# Patient Record
Sex: MALE | Race: WHITE | Employment: FULL TIME | URBAN - METROPOLITAN AREA
[De-identification: names, ages, dates, MRNs, and addresses within clinical notes are randomized per-mention and may not be internally consistent; named-entity substitution may affect disease eponyms.]

---

## 2017-08-18 ENCOUNTER — OFFICE VISIT (OUTPATIENT)
Dept: FAMILY MEDICINE CLINIC | Facility: CLINIC | Age: 52
End: 2017-08-18

## 2017-08-18 VITALS
TEMPERATURE: 98 F | HEIGHT: 71.5 IN | SYSTOLIC BLOOD PRESSURE: 120 MMHG | HEART RATE: 44 BPM | RESPIRATION RATE: 16 BRPM | BODY MASS INDEX: 24.79 KG/M2 | DIASTOLIC BLOOD PRESSURE: 80 MMHG | WEIGHT: 181 LBS

## 2017-08-18 DIAGNOSIS — M62.830 LUMBAR PARASPINAL MUSCLE SPASM: Primary | ICD-10-CM

## 2017-08-18 PROBLEM — D12.6 BENIGN NEOPLASM OF COLON: Status: ACTIVE | Noted: 2017-08-18

## 2017-08-18 PROCEDURE — 99214 OFFICE O/P EST MOD 30 MIN: CPT | Performed by: FAMILY MEDICINE

## 2017-08-18 RX ORDER — NAPROXEN 500 MG/1
500 TABLET ORAL 2 TIMES DAILY WITH MEALS
Qty: 28 TABLET | Refills: 0 | Status: SHIPPED | OUTPATIENT
Start: 2017-08-18 | End: 2017-09-01

## 2017-08-18 RX ORDER — CYCLOBENZAPRINE HCL 10 MG
10 TABLET ORAL 3 TIMES DAILY
Qty: 15 TABLET | Refills: 0 | Status: SHIPPED | OUTPATIENT
Start: 2017-08-18 | End: 2017-08-23

## 2017-08-18 RX ORDER — IBUPROFEN 200 MG
400 TABLET ORAL AS NEEDED
COMMUNITY
End: 2017-12-20

## 2017-08-18 NOTE — PATIENT INSTRUCTIONS
Back Spasm (No Trauma)    Spasm of the back muscles can occur after a sudden forceful twisting or bending force (such as in a car accident), after a simple awkward movement, or after lifting something heavy with poor body positioning.  In any case, muscle · You can start with ice, then switch to heat. Heat (hot shower, hot bath, or heating pad) reduces pain, and works well for muscle spasms. Apply heat to the painful area for 20 minutes, then remove it for 20 minutes.  Do this over a period of 60 to 90 minut If X-rays were taken, they may be reviewed by a radiologist. Renny Abreu will be notified of any new findings that may affect your care.   Call 911  Seek emergency medical care if any of these occur:  · Trouble breathing  · Confusion  · Drowsiness or trouble awaken · You may use over-the-counter pain medicine to control pain, unless another medicine was prescribed. If you have chronic liver or kidney disease or ever had a stomach ulcer or GI bleeding, talk with your healthcare provider before using these medicines.

## 2017-08-18 NOTE — PROGRESS NOTES
[facility] Family Medicine Office Note  Chief Complaint:   Patient presents with:  Back Pain: 3 weeks ago-during run LBP spasms-stopped, walk rest of way-chiro that Mon-better, ran again 1 week ago tomorrow-16 miles run-again Sun-ok.  ran [employer] mmol/L   CALCIUM 9.7 8.6 - 10.3 mg/dL   PROTEIN, TOTAL 6.6 6.1 - 8.1 g/dL   ALBUMIN 4.6 3.6 - 5.1 g/dL   GLOBULIN 2.0 1.9 - 3.7 g/dL (calc)   ALBUMIN/GLOBULIN RATIO 2.3 1.0 - 2.5 (calc)   BILIRUBIN, TOTAL 2.8 (H) 0.2 - 1.2 mg/dL   ALKALINE PHOSPHATASE 57 4 Smokeless tobacco: Never Used                      Alcohol use:  Yes              Comment: 3x week     Family History:  Family History   Problem Relation Age of Onset   • Pacemaker Father    • Glaucoma eczema or rhinitis.      EXAM:   /80 (BP Location: Right arm, Patient Position: Sitting, Cuff Size: adult)   Pulse (!) 44   Temp 98 °F (36.7 °C)   Resp 16   Ht 71.5\"   Wt 181 lb   BMI 24.89 kg/m²  Estimated body mass index is 24.89 kg/m² as calculate total) by mouth 2 (two) times daily with meals. Dispense: 28 tablet; Refill: 0  - XR LUMBAR SPINE (MIN 4 VIEWS) (CPT=72110);  Future      Meds & Refills for this Visit:    Signed Prescriptions Disp Refills    Cyclobenzaprine HCl 10 MG Oral Tab 15 tablet 0

## 2017-12-20 ENCOUNTER — OFFICE VISIT (OUTPATIENT)
Dept: FAMILY MEDICINE CLINIC | Facility: CLINIC | Age: 52
End: 2017-12-20

## 2017-12-20 VITALS
RESPIRATION RATE: 12 BRPM | HEIGHT: 71.5 IN | HEART RATE: 54 BPM | WEIGHT: 181 LBS | SYSTOLIC BLOOD PRESSURE: 100 MMHG | DIASTOLIC BLOOD PRESSURE: 70 MMHG | BODY MASS INDEX: 24.79 KG/M2

## 2017-12-20 DIAGNOSIS — Z13.228 SCREENING FOR ENDOCRINE, METABOLIC AND IMMUNITY DISORDER: ICD-10-CM

## 2017-12-20 DIAGNOSIS — Z13.0 SCREENING FOR ENDOCRINE, METABOLIC AND IMMUNITY DISORDER: ICD-10-CM

## 2017-12-20 DIAGNOSIS — Z13.89 SCREENING FOR GENITOURINARY CONDITION: ICD-10-CM

## 2017-12-20 DIAGNOSIS — Z00.00 ROUTINE GENERAL MEDICAL EXAMINATION AT A HEALTH CARE FACILITY: Primary | ICD-10-CM

## 2017-12-20 DIAGNOSIS — Z13.29 SCREENING FOR ENDOCRINE, METABOLIC AND IMMUNITY DISORDER: ICD-10-CM

## 2017-12-20 DIAGNOSIS — E01.0 THYROMEGALY: ICD-10-CM

## 2017-12-20 DIAGNOSIS — Z00.00 LABORATORY EXAMINATION ORDERED AS PART OF A ROUTINE GENERAL MEDICAL EXAMINATION: ICD-10-CM

## 2017-12-20 DIAGNOSIS — Z11.59 ENCOUNTER FOR HEPATITIS C SCREENING TEST FOR LOW RISK PATIENT: ICD-10-CM

## 2017-12-20 PROCEDURE — 99396 PREV VISIT EST AGE 40-64: CPT | Performed by: FAMILY MEDICINE

## 2017-12-20 PROCEDURE — 81003 URINALYSIS AUTO W/O SCOPE: CPT | Performed by: FAMILY MEDICINE

## 2017-12-20 NOTE — H&P
Padmini Drake is a 46year old male who presents for a complete physical exam.   HPI:   Pt complains of nothing. Wonders if he should worry about testosterone -- has no issues otherwise.   Pt. Notes discomfort in right medial elbow at times when he lif 50.0 %   MCV 95.1 80.0 - 100.0 fL   MCH 32.0 27.0 - 33.0 pg   MCHC 33.7 32.0 - 36.0 g/dL   RDW 12.5 11.0 - 15.0 %   PLATELET COUNT 471 925 - 400 Thousand/uL   MPV 9.3 7.5 - 12.5 fL   ABSOLUTE NEUTROPHILS 3,037 1,500 - 7,800 cells/uL   ABSOLUTE LYMPHOCYTES SYSTEMS:   GENERAL: feels well otherwise  SKIN: denies any unusual skin lesions  EYES:denies blurred vision or double vision  HEENT: denies nasal congestion, sinus pain or ST  LUNGS: denies shortness of breath with exertion  CARDIOVASCULAR: denies chest pa and PSA.      Routine general medical examination at a health care facility  (primary encounter diagnosis)  Screening for genitourinary condition  Laboratory examination ordered as part of a routine general medical examination  Encounter for hepatitis c scr

## 2017-12-28 ENCOUNTER — HOSPITAL ENCOUNTER (OUTPATIENT)
Dept: ULTRASOUND IMAGING | Facility: HOSPITAL | Age: 52
Discharge: HOME | End: 2017-12-28
Attending: FAMILY MEDICINE
Payer: COMMERCIAL

## 2017-12-28 DIAGNOSIS — E01.0 THYROMEGALY: ICD-10-CM

## 2017-12-28 PROCEDURE — 76536 US EXAM OF HEAD AND NECK: CPT | Performed by: FAMILY MEDICINE

## 2018-03-03 LAB
% FREE PSA: 43 % (CALC)
ABSOLUTE BASOPHILS: 30 CELLS/UL (ref 0–200)
ABSOLUTE EOSINOPHILS: 60 CELLS/UL (ref 15–500)
ABSOLUTE LYMPHOCYTES: 1522 CELLS/UL (ref 850–3900)
ABSOLUTE MONOCYTES: 400 CELLS/UL (ref 200–950)
ABSOLUTE NEUTROPHILS: 2288 CELLS/UL (ref 1500–7800)
ALBUMIN/GLOBULIN RATIO: 2.3 (CALC) (ref 1–2.5)
ALBUMIN: 4.4 G/DL (ref 3.6–5.1)
ALKALINE PHOSPHATASE: 55 U/L (ref 40–115)
ALT: 16 U/L (ref 9–46)
AST: 24 U/L (ref 10–35)
BASOPHILS: 0.7 %
BILIRUBIN, TOTAL: 3.2 MG/DL (ref 0.2–1.2)
BUN: 14 MG/DL (ref 7–25)
CALCIUM: 9.3 MG/DL (ref 8.6–10.3)
CARBON DIOXIDE: 30 MMOL/L (ref 20–31)
CHLORIDE: 106 MMOL/L (ref 98–110)
CHOL/HDLC RATIO: 2.6 (CALC)
CHOLESTEROL, TOTAL: 144 MG/DL
CREATININE: 1.11 MG/DL (ref 0.7–1.33)
EGFR IF AFRICN AM: 88 ML/MIN/1.73M2
EGFR IF NONAFRICN AM: 76 ML/MIN/1.73M2
EOSINOPHILS: 1.4 %
FREE PSA: 0.3 NG/ML
GLOBULIN: 1.9 G/DL (CALC) (ref 1.9–3.7)
GLUCOSE: 90 MG/DL (ref 65–99)
HDL CHOLESTEROL: 56 MG/DL
HEMATOCRIT: 41.5 % (ref 38.5–50)
HEMOGLOBIN: 14.4 G/DL (ref 13.2–17.1)
LDL-CHOLESTEROL: 74 MG/DL (CALC)
LYMPHOCYTES: 35.4 %
MCH: 32.9 PG (ref 27–33)
MCHC: 34.7 G/DL (ref 32–36)
MCV: 94.7 FL (ref 80–100)
MEASLES ANTIBODY (IGG): >300 AU/ML
MONOCYTES: 9.3 %
MPV: 10.8 FL (ref 7.5–12.5)
MUMPS VIRUS$ANTIBODY (IGG): >300 AU/ML
NEUTROPHILS: 53.2 %
NON-HDL CHOLESTEROL: 88 MG/DL (CALC)
PLATELET COUNT: 182 THOUSAND/UL (ref 140–400)
POTASSIUM: 4.1 MMOL/L (ref 3.5–5.3)
PROTEIN, TOTAL: 6.3 G/DL (ref 6.1–8.1)
RDW: 11.6 % (ref 11–15)
RED BLOOD CELL COUNT: 4.38 MILLION/UL (ref 4.2–5.8)
RUBELLA ANTIBODY (IGG) DIAGNOSTIC: 23.5 INDEX
RUBELLA ANTIBODY (IGM): <20 AU/ML
SIGNAL TO CUT-OFF: 0.01
SODIUM: 143 MMOL/L (ref 135–146)
TOTAL PSA: 0.7 NG/ML
TRIGLYCERIDES: 65 MG/DL
TSH W/REFLEX TO FT4: 2.12 MIU/L (ref 0.4–4.5)
VARICELLA ZOSTER VIRUS$AB (IGG): 508.7 INDEX
VITAMIN D, 25-OH, TOTAL: 32 NG/ML (ref 30–100)
WHITE BLOOD CELL COUNT: 4.3 THOUSAND/UL (ref 3.8–10.8)

## 2018-12-20 ENCOUNTER — OFFICE VISIT (OUTPATIENT)
Dept: FAMILY MEDICINE CLINIC | Facility: CLINIC | Age: 53
End: 2018-12-20
Payer: COMMERCIAL

## 2018-12-20 VITALS
SYSTOLIC BLOOD PRESSURE: 110 MMHG | WEIGHT: 184 LBS | RESPIRATION RATE: 14 BRPM | DIASTOLIC BLOOD PRESSURE: 70 MMHG | HEART RATE: 60 BPM | HEIGHT: 71 IN | BODY MASS INDEX: 25.76 KG/M2

## 2018-12-20 DIAGNOSIS — Z13.89 SCREENING FOR GENITOURINARY CONDITION: ICD-10-CM

## 2018-12-20 DIAGNOSIS — Z00.00 LABORATORY EXAMINATION ORDERED AS PART OF A ROUTINE GENERAL MEDICAL EXAMINATION: ICD-10-CM

## 2018-12-20 DIAGNOSIS — R10.32 LEFT GROIN PAIN: ICD-10-CM

## 2018-12-20 DIAGNOSIS — Z00.00 ROUTINE GENERAL MEDICAL EXAMINATION AT A HEALTH CARE FACILITY: Primary | ICD-10-CM

## 2018-12-20 PROCEDURE — 99396 PREV VISIT EST AGE 40-64: CPT | Performed by: FAMILY MEDICINE

## 2018-12-20 NOTE — H&P
Janet Vale is a 48year old male who presents for a complete physical exam.   HPI:   Pt complains of nothing. Wonders if he should worry about testosterone -- has no issues otherwise.   Pt. Notes discomfort in right medial elbow at times when he (BUN) 14 7 - 25 mg/dL    CREATININE 1.11 0.70 - 1.33 mg/dL    eGFR NON-AFR.  AMERICAN 76 > OR = 60 mL/min/1.73m2    eGFR AFRICAN AMERICAN 88 > OR = 60 mL/min/1.73m2    BUN/CREATININE RATIO NOT APPLICABLE 6 - 22 (calc)    SODIUM 143 135 - 146 mmol/L    POTAS 1000 units Oral Tab Take 1,000 Units by mouth daily. Disp:  Rfl:    Multiple Vitamins-Minerals (EYE-LORENA EXTRA PLUS LUTEIN) Oral Tab Take by mouth.  Disp:  Rfl:       No Known Allergies   Past Medical History:   Diagnosis Date   • Colon adenoma    • Goiter EOMI,conjunctiva are clear  NECK: supple,no adenopathy,no bruits; thyromegaly  CHEST: no chest tenderness  BREAST: no dominant or suspicious mass  LUNGS: clear to auscultation  CARDIO: RRR without murmur  GI: good BS's,no masses, HSM or tenderness  : Liya

## 2019-01-07 ENCOUNTER — OFFICE VISIT (OUTPATIENT)
Dept: PHYSICAL THERAPY | Age: 54
End: 2019-01-07
Attending: FAMILY MEDICINE
Payer: COMMERCIAL

## 2019-01-07 PROCEDURE — 97161 PT EVAL LOW COMPLEX 20 MIN: CPT

## 2019-01-08 NOTE — PROGRESS NOTES
LOWER EXTREMITY EVALUATION:   Referring Physician: Dr. Chantelle Owen MD  Diagnosis: Groin pain     Date of Service: 1/7/2019     PATIENT SUMMARY   Maggi Torres is a 48year old y/o male who presents to therapy today with complaints of pain in the tightness  Quads: moderate tightness bilaterally  Gastroc-soleus: minimal tightness    Strength/MMT:   Hip grossly 5/5  Knee grossly 5/5  Trunk flexors 5/5 - pain in lower left abdominal region    Special tests: Negative hip scour test    Balance: SLS: nor

## 2019-01-09 ENCOUNTER — OFFICE VISIT (OUTPATIENT)
Dept: PHYSICAL THERAPY | Age: 54
End: 2019-01-09
Attending: FAMILY MEDICINE
Payer: COMMERCIAL

## 2019-01-09 PROCEDURE — 97112 NEUROMUSCULAR REEDUCATION: CPT

## 2019-01-09 PROCEDURE — 97110 THERAPEUTIC EXERCISES: CPT

## 2019-01-09 PROCEDURE — 97140 MANUAL THERAPY 1/> REGIONS: CPT

## 2019-01-09 NOTE — PROGRESS NOTES
Dx: Left groin pain         Authorized # of Visits:  8         Next MD visit: none scheduled  Fall Risk: standard         Precautions: n/a             Subjective: Following evaluation pt reports no increase in pain.  Left lower abdomen/groin with getting up hip flexor stretch R/L 3 x 10 sec hold         Prone press up abdomen stretch 3 x 10 sec hold         Seated trunk lateral flexion stretch with arm overhead R/L 3 x 10 sec hold            Charges: MT 1, EX 1, NMRed 1       Total Timed Treatment: 40 min  To

## 2019-01-14 ENCOUNTER — OFFICE VISIT (OUTPATIENT)
Dept: PHYSICAL THERAPY | Age: 54
End: 2019-01-14
Attending: FAMILY MEDICINE
Payer: COMMERCIAL

## 2019-01-14 PROCEDURE — 97112 NEUROMUSCULAR REEDUCATION: CPT

## 2019-01-14 PROCEDURE — 97110 THERAPEUTIC EXERCISES: CPT

## 2019-01-14 PROCEDURE — 97140 MANUAL THERAPY 1/> REGIONS: CPT

## 2019-01-16 ENCOUNTER — OFFICE VISIT (OUTPATIENT)
Dept: PHYSICAL THERAPY | Age: 54
End: 2019-01-16
Attending: FAMILY MEDICINE
Payer: COMMERCIAL

## 2019-01-16 PROCEDURE — 97140 MANUAL THERAPY 1/> REGIONS: CPT

## 2019-01-16 PROCEDURE — 97110 THERAPEUTIC EXERCISES: CPT

## 2019-01-16 PROCEDURE — 97112 NEUROMUSCULAR REEDUCATION: CPT

## 2019-01-16 NOTE — PROGRESS NOTES
Dx: Left groin pain         Authorized # of Visits:  8         Next MD visit: none scheduled  Fall Risk: standard         Precautions: n/a             Subjective: Have not been running or working out except for the therapy exercises.   Objective: No pain ju sec hold Hook lying abd bracing bent leg lift R/L x 10  Bent leg fallout R/L x 10 Prone passive L hip ER/IR x 10       Hook lying abd bracing bent leg lift R/L x 10  Bent leg fallout R/L x 10 Prone press up abdominal stretch x 10 Prone press up 3 x 10 sec

## 2019-01-16 NOTE — PROGRESS NOTES
Dx: Left groin pain         Authorized # of Visits:  8         Next MD visit: none scheduled  Fall Risk: standard         Precautions: n/a             Subjective: Slight improvement since initial visit.  Have not been running or working out except for the t R/L x 10  Bent leg fallout R/L x 10 Prone press up abdominal stretch x 10        Hip flexor stretch over edge with opposite side hip flexion R/L 3 x 10 sec hold Prone quads and hip flexor stretch R/L 3 x 30 sec hold        STM 10 mins L lower abdomen and g

## 2019-01-23 ENCOUNTER — OFFICE VISIT (OUTPATIENT)
Dept: PHYSICAL THERAPY | Age: 54
End: 2019-01-23
Attending: FAMILY MEDICINE
Payer: COMMERCIAL

## 2019-01-23 PROCEDURE — 97140 MANUAL THERAPY 1/> REGIONS: CPT

## 2019-01-23 PROCEDURE — 97112 NEUROMUSCULAR REEDUCATION: CPT

## 2019-01-23 PROCEDURE — 97110 THERAPEUTIC EXERCISES: CPT

## 2019-01-24 NOTE — PROGRESS NOTES
Dx: Left groin pain         Authorized # of Visits:  8         Next MD visit: none scheduled  Fall Risk: standard         Precautions: n/a             Subjective: Biked on  indoor 2 days 30 mins each without pain.  I feel I am having less pain than a 30 secs       Hook lying trunk rotation with arms above head R/L 3 x 10 sec hold Supine L hip mobilizations to increase ER grade 3 2 x 30 secs Prone quads stretch L 3 x 30 sec hold  Hip flexor stretch with hip ext L 3 x 20 sec hold Hip flexor, quads stretc tendon insertion 10 mins total time Supine with hips flexed to 90 deg alternating leg ext R/L x 10  Pt education 5 mins Supine STM ant hip, medial femur at adductor tendon insertion and pubic bone at abdominal insertion 5 mins        Charges: MT 1, EX 1, N

## 2019-02-06 ENCOUNTER — OFFICE VISIT (OUTPATIENT)
Dept: PHYSICAL THERAPY | Age: 54
End: 2019-02-06
Attending: FAMILY MEDICINE
Payer: COMMERCIAL

## 2019-02-06 PROCEDURE — 97110 THERAPEUTIC EXERCISES: CPT

## 2019-02-06 PROCEDURE — 97140 MANUAL THERAPY 1/> REGIONS: CPT

## 2019-02-06 PROCEDURE — 97112 NEUROMUSCULAR REEDUCATION: CPT

## 2019-02-07 ENCOUNTER — APPOINTMENT (OUTPATIENT)
Dept: PHYSICAL THERAPY | Age: 54
End: 2019-02-07
Attending: FAMILY MEDICINE
Payer: COMMERCIAL

## 2019-02-07 NOTE — PROGRESS NOTES
Dx: Left groin pain         Authorized # of Visits:  8         Next MD visit: none scheduled  Fall Risk: standard         Precautions: n/a             Subjective: Biked on  indoor 2 days 30 mins each without pain.  I feel I am having less pain than a with arms above head R/L 3 x 10 sec hold Supine L hip mobilizations to increase ER grade 3 2 x 30 secs Prone quads stretch L 3 x 30 sec hold  Hip flexor stretch with hip ext L 3 x 20 sec hold Hip flexor, quads stretch over edge of table L/R 3 x 30 sec hold trunk rotation with reach x 10 Hook lying bent leg lift R/L x 10  Bent leg lift/lower x 10 Pt education 5 mins Side plank R/L 30 sec hold     Seated trunk lateral flexion stretch with arm overhead R/L 3 x 10 sec hold  Hook lying STM lower abdominal and add

## 2019-02-20 ENCOUNTER — APPOINTMENT (OUTPATIENT)
Dept: PHYSICAL THERAPY | Age: 54
End: 2019-02-20
Attending: FAMILY MEDICINE
Payer: COMMERCIAL

## 2019-02-27 ENCOUNTER — APPOINTMENT (OUTPATIENT)
Dept: PHYSICAL THERAPY | Age: 54
End: 2019-02-27
Attending: FAMILY MEDICINE
Payer: COMMERCIAL

## 2019-03-04 ENCOUNTER — OFFICE VISIT (OUTPATIENT)
Dept: PHYSICAL THERAPY | Age: 54
End: 2019-03-04
Attending: FAMILY MEDICINE
Payer: COMMERCIAL

## 2019-03-11 ENCOUNTER — HOSPITAL ENCOUNTER (OUTPATIENT)
Facility: HOSPITAL | Age: 54
Setting detail: HOSPITAL OUTPATIENT SURGERY
Discharge: HOME | End: 2019-03-11
Attending: INTERNAL MEDICINE | Admitting: INTERNAL MEDICINE
Payer: COMMERCIAL

## 2019-03-11 VITALS
HEART RATE: 41 BPM | TEMPERATURE: 98 F | HEIGHT: 71 IN | OXYGEN SATURATION: 98 % | RESPIRATION RATE: 10 BRPM | DIASTOLIC BLOOD PRESSURE: 78 MMHG | BODY MASS INDEX: 25.2 KG/M2 | SYSTOLIC BLOOD PRESSURE: 136 MMHG | WEIGHT: 180 LBS

## 2019-03-11 DIAGNOSIS — Z86.010 PERSONAL HISTORY OF COLONIC POLYPS: ICD-10-CM

## 2019-03-11 PROCEDURE — 88305 TISSUE EXAM BY PATHOLOGIST: CPT | Performed by: INTERNAL MEDICINE

## 2019-03-11 PROCEDURE — 0DBL8ZX EXCISION OF TRANSVERSE COLON, VIA NATURAL OR ARTIFICIAL OPENING ENDOSCOPIC, DIAGNOSTIC: ICD-10-PCS | Performed by: INTERNAL MEDICINE

## 2019-03-11 PROCEDURE — 99152 MOD SED SAME PHYS/QHP 5/>YRS: CPT | Performed by: INTERNAL MEDICINE

## 2019-03-11 RX ORDER — SODIUM CHLORIDE, SODIUM LACTATE, POTASSIUM CHLORIDE, CALCIUM CHLORIDE 600; 310; 30; 20 MG/100ML; MG/100ML; MG/100ML; MG/100ML
INJECTION, SOLUTION INTRAVENOUS CONTINUOUS
Status: DISCONTINUED | OUTPATIENT
Start: 2019-03-11 | End: 2019-03-11

## 2019-03-11 NOTE — OPERATIVE REPORT
[facility]  Colonoscopy Report      Dalila Jackson Patient Status:  Hospital Outpatient Surgery    10/18/1965 MRN [id number]   [facility] ENDOSCOPY Attending Gaye Meneses MD       DATE OF OPERATION: 3/11/2019     PREOPERATIVE

## 2019-03-11 NOTE — H&P
[facility]  Pre-procedure History and Physical      Rico Daniels Patient Status:  Hospital Outpatient Surgery    10/18/1965 MRN [id number]   [facility] ENDOSCOPY Attending Rob Degroot MD   Hosp Day # 0 PCP Dann Jin DO

## 2019-03-13 NOTE — PROGRESS NOTES
Please send to the patient:    Dear Jose E Merino,    I reviewed the results from your colonoscopy. You had one polyp removed. It is benign. In light of this and your history of colon polyps, your next colonoscopy should be in five years.     Please let me kn

## 2019-11-13 ENCOUNTER — PATIENT MESSAGE (OUTPATIENT)
Dept: FAMILY MEDICINE CLINIC | Facility: CLINIC | Age: 54
End: 2019-11-13

## 2019-11-13 DIAGNOSIS — Z00.00 LABORATORY EXAMINATION ORDERED AS PART OF A ROUTINE GENERAL MEDICAL EXAMINATION: Primary | ICD-10-CM

## 2019-11-13 DIAGNOSIS — Z13.89 SCREENING FOR GENITOURINARY CONDITION: ICD-10-CM

## 2019-11-15 NOTE — TELEPHONE ENCOUNTER
From: Cindy Moses  To: Rogerio Araujo DO  Sent: 11/13/2019 8:36 AM CST  Subject: Visit Follow-up Question    My annual physical is scheduled in two weeks (Nov 26th). A standard blood screen is usually prescribed as part of the visit.  Should I have th

## 2019-11-26 ENCOUNTER — OFFICE VISIT (OUTPATIENT)
Dept: FAMILY MEDICINE CLINIC | Facility: CLINIC | Age: 54
End: 2019-11-26
Payer: COMMERCIAL

## 2019-11-26 VITALS
RESPIRATION RATE: 16 BRPM | WEIGHT: 177 LBS | BODY MASS INDEX: 24.51 KG/M2 | DIASTOLIC BLOOD PRESSURE: 70 MMHG | HEART RATE: 56 BPM | TEMPERATURE: 97 F | HEIGHT: 71.25 IN | SYSTOLIC BLOOD PRESSURE: 110 MMHG

## 2019-11-26 DIAGNOSIS — M79.674 PAIN OF TOE OF RIGHT FOOT: ICD-10-CM

## 2019-11-26 DIAGNOSIS — Z00.00 ROUTINE GENERAL MEDICAL EXAMINATION AT A HEALTH CARE FACILITY: Primary | ICD-10-CM

## 2019-11-26 DIAGNOSIS — Z00.00 LABORATORY EXAMINATION ORDERED AS PART OF A ROUTINE GENERAL MEDICAL EXAMINATION: ICD-10-CM

## 2019-11-26 DIAGNOSIS — L57.0 AK (ACTINIC KERATOSIS): ICD-10-CM

## 2019-11-26 PROCEDURE — 99396 PREV VISIT EST AGE 40-64: CPT | Performed by: FAMILY MEDICINE

## 2019-11-26 RX ORDER — METHYLPREDNISOLONE 4 MG/1
TABLET ORAL
Qty: 1 KIT | Refills: 0 | Status: SHIPPED | OUTPATIENT
Start: 2019-11-26 | End: 2020-02-11 | Stop reason: ALTCHOICE

## 2019-11-26 NOTE — H&P
Scott Liu is a 47year old male who presents for a complete physical exam.   HPI:   Pt complains of left great toe pain since he stubbed it against a rock about 1 month ago.   He states it is feeling better but notes tenderness in the distal aspect Take 1,000 Units by mouth daily. • Multiple Vitamins-Minerals (EYE-LORENA EXTRA PLUS LUTEIN) Oral Tab Take by mouth.         No Known Allergies   Past Medical History:   Diagnosis Date   • Colon adenoma    • Goiter    • Viral warts       Past Surgical His distress  SKIN: no rashes,no suspicious lesions; AK?  Right proximal thumb and left forearm  HEENT: atraumatic, normocephalic,ears -fluid behind left TM and throat are clear, mild swollen turbs with clear drainage  EYES:PERRLA, EOMI,conjunctiva are clear  N Prescriptions     Signed Prescriptions Disp Refills   • methylPREDNISolone (MEDROL) 4 MG Oral Tablet Therapy Pack 1 kit 0     Sig: As directed.        Imaging & Consults:  ZOSTER VACC RECOMBINANT IM NJX  XR TOE(S) (MIN 2 VIEWS), RIGHT 1ST (CPT=73660)    LAs

## 2020-02-11 ENCOUNTER — OFFICE VISIT (OUTPATIENT)
Dept: FAMILY MEDICINE CLINIC | Facility: CLINIC | Age: 55
End: 2020-02-11
Payer: COMMERCIAL

## 2020-02-11 VITALS
HEIGHT: 71.25 IN | DIASTOLIC BLOOD PRESSURE: 70 MMHG | HEART RATE: 60 BPM | TEMPERATURE: 97 F | SYSTOLIC BLOOD PRESSURE: 116 MMHG | BODY MASS INDEX: 24.09 KG/M2 | OXYGEN SATURATION: 98 % | WEIGHT: 174 LBS | RESPIRATION RATE: 16 BRPM

## 2020-02-11 DIAGNOSIS — Z23 NEED FOR VACCINATION: ICD-10-CM

## 2020-02-11 DIAGNOSIS — Z71.85 VACCINE COUNSELING: ICD-10-CM

## 2020-02-11 DIAGNOSIS — L57.0 AK (ACTINIC KERATOSIS): Primary | ICD-10-CM

## 2020-02-11 PROCEDURE — 17003 DESTRUCT PREMALG LES 2-14: CPT | Performed by: FAMILY MEDICINE

## 2020-02-11 PROCEDURE — 17000 DESTRUCT PREMALG LESION: CPT | Performed by: FAMILY MEDICINE

## 2020-02-11 RX ORDER — OMEGA-3 FATTY ACIDS CAP DELAYED RELEASE 1000 MG 1000 MG
2 CAPSULE DELAYED RELEASE ORAL
COMMUNITY
End: 2021-11-12

## 2020-02-11 NOTE — PROGRESS NOTES
Tashi Turner is a 47year old male. HPI:   Pt.  Is here for AK removal.  He states he would like to shop around for the shingrix vaccine at this time,.      methylPREDNISolone (MEDROL) 4 MG Oral Tablet Therapy Pack, As directed., Disp: 1 kit, Rfl: 0 1.33 mg/dL    eGFR NON-AFR.  AMERICAN 71 > OR = 60 mL/min/1.73m2    eGFR AFRICAN AMERICAN 82 > OR = 60 mL/min/1.73m2    BUN/CREATININE RATIO NOT APPLICABLE 6 - 22 (calc)    SODIUM 142 135 - 146 mmol/L    POTASSIUM 4.0 3.5 - 5.3 mmol/L    CHLORIDE 105 98 - 1 exertion  CARDIOVASCULAR: denies chest pain on exertion  GI: denies abdominal pain,denies heartburn  MUSCULOSKELETAL: denies back pain  EXTREMITIES:  No pain or numbness    EXAM:   /70 (BP Location: Right arm, Patient Position: Sitting, Cuff Size: ad

## 2020-02-20 ENCOUNTER — HOSPITAL ENCOUNTER (OUTPATIENT)
Age: 55
Discharge: HOME | End: 2020-02-20
Attending: EMERGENCY MEDICINE
Payer: COMMERCIAL

## 2020-02-20 VITALS
SYSTOLIC BLOOD PRESSURE: 114 MMHG | TEMPERATURE: 98 F | OXYGEN SATURATION: 100 % | RESPIRATION RATE: 18 BRPM | DIASTOLIC BLOOD PRESSURE: 74 MMHG | WEIGHT: 180 LBS | BODY MASS INDEX: 24.38 KG/M2 | HEIGHT: 72 IN | HEART RATE: 60 BPM

## 2020-02-20 DIAGNOSIS — J11.1 INFLUENZA: ICD-10-CM

## 2020-02-20 DIAGNOSIS — J02.9 ACUTE VIRAL PHARYNGITIS: Primary | ICD-10-CM

## 2020-02-20 LAB
POCT INFLUENZA A: NEGATIVE
POCT INFLUENZA B: POSITIVE
POCT RAPID STREP: NEGATIVE

## 2020-02-20 PROCEDURE — 99214 OFFICE O/P EST MOD 30 MIN: CPT

## 2020-02-20 PROCEDURE — 87502 INFLUENZA DNA AMP PROBE: CPT | Performed by: EMERGENCY MEDICINE

## 2020-02-20 PROCEDURE — 99204 OFFICE O/P NEW MOD 45 MIN: CPT

## 2020-02-20 PROCEDURE — 87081 CULTURE SCREEN ONLY: CPT | Performed by: EMERGENCY MEDICINE

## 2020-02-20 PROCEDURE — 87430 STREP A AG IA: CPT | Performed by: EMERGENCY MEDICINE

## 2020-02-20 RX ORDER — OSELTAMIVIR PHOSPHATE 75 MG/1
75 CAPSULE ORAL 2 TIMES DAILY
Qty: 10 CAPSULE | Refills: 0 | Status: SHIPPED | OUTPATIENT
Start: 2020-02-20 | End: 2020-02-25

## 2020-02-20 NOTE — ED PROVIDER NOTES
Patient Seen in: [address] [facility]      History   Patient presents with:  Sore Throat    Stated Complaint: sore throat    HPI    This is a 49-year-old male who arrives here with complaints of a sore throat since yesterday.   The pa 100%   BMI 24.41 kg/m²         Physical Exam    General:    HEENT: The patient has findings of a viral syndrome. TMs are clear without erythema  Throat has mild erythema without exudate, peritonsillar abscess  There is no meningismus.       Lungs: Clear to

## 2020-02-20 NOTE — ED INITIAL ASSESSMENT (HPI)
Pt c/o sore throat since yesterday, and states he has had flu like s/s including body aches, intermittent fevers as high as 100.4 at home, cough, that started Monday night.

## 2020-03-19 ENCOUNTER — PATIENT MESSAGE (OUTPATIENT)
Dept: FAMILY MEDICINE CLINIC | Facility: CLINIC | Age: 55
End: 2020-03-19

## 2020-03-20 NOTE — TELEPHONE ENCOUNTER
From: Maricarmen Vaughan  To: Michelle Guillen DO  Sent: 3/19/2020 4:02 PM CDT  Subject: Other    I am required to supply Medical Form C to the [employer] ([employer]). Please fill out the attached based on my last physical in Q4 of 2019. Thanks.  Wilmer Sauceda

## 2020-03-23 ENCOUNTER — MED REC SCAN ONLY (OUTPATIENT)
Dept: FAMILY MEDICINE CLINIC | Facility: CLINIC | Age: 55
End: 2020-03-23

## 2020-11-11 ENCOUNTER — PATIENT MESSAGE (OUTPATIENT)
Dept: FAMILY MEDICINE CLINIC | Facility: CLINIC | Age: 55
End: 2020-11-11

## 2020-11-11 DIAGNOSIS — E55.9 VITAMIN D DEFICIENCY: ICD-10-CM

## 2020-11-11 DIAGNOSIS — Z13.89 SCREENING FOR GENITOURINARY CONDITION: ICD-10-CM

## 2020-11-11 DIAGNOSIS — Z00.00 LABORATORY EXAMINATION ORDERED AS PART OF A ROUTINE GENERAL MEDICAL EXAMINATION: Primary | ICD-10-CM

## 2020-11-11 NOTE — TELEPHONE ENCOUNTER
From: Mari Luis  To: Jovany Blanco DO  Sent: 11/11/2020 9:35 AM CST  Subject: Visit Follow-up Question    My annual physical is next week - Nov 16.  Should I have blood work done prior so that results can be discussed when I am in office on the 16t

## 2020-11-15 ENCOUNTER — PATIENT MESSAGE (OUTPATIENT)
Dept: FAMILY MEDICINE CLINIC | Facility: CLINIC | Age: 55
End: 2020-11-15

## 2020-11-20 DIAGNOSIS — R73.9 HYPERGLYCEMIA: Primary | ICD-10-CM

## 2020-11-20 RX ORDER — THIAMINE HCL 100 MG
TABLET ORAL
COMMUNITY
Start: 2020-01-01 | End: 2021-11-12

## 2020-11-23 ENCOUNTER — TELEPHONE (OUTPATIENT)
Dept: FAMILY MEDICINE CLINIC | Facility: CLINIC | Age: 55
End: 2020-11-23

## 2020-11-23 RX ORDER — ERGOCALCIFEROL 1.25 MG/1
50000 CAPSULE ORAL WEEKLY
Qty: 8 CAPSULE | Refills: 0 | Status: SHIPPED | OUTPATIENT
Start: 2020-11-23 | End: 2021-11-12

## 2020-11-24 ENCOUNTER — OFFICE VISIT (OUTPATIENT)
Dept: FAMILY MEDICINE CLINIC | Facility: CLINIC | Age: 55
End: 2020-11-24
Payer: COMMERCIAL

## 2020-11-24 VITALS
HEIGHT: 72 IN | RESPIRATION RATE: 14 BRPM | WEIGHT: 178 LBS | DIASTOLIC BLOOD PRESSURE: 54 MMHG | HEART RATE: 48 BPM | SYSTOLIC BLOOD PRESSURE: 126 MMHG | TEMPERATURE: 98 F | BODY MASS INDEX: 24.11 KG/M2

## 2020-11-24 DIAGNOSIS — Z71.85 VACCINE COUNSELING: ICD-10-CM

## 2020-11-24 DIAGNOSIS — Z00.00 ROUTINE GENERAL MEDICAL EXAMINATION AT A HEALTH CARE FACILITY: Primary | ICD-10-CM

## 2020-11-24 DIAGNOSIS — D17.22 LIPOMA OF LEFT UPPER EXTREMITY: ICD-10-CM

## 2020-11-24 DIAGNOSIS — E55.9 VITAMIN D DEFICIENCY: ICD-10-CM

## 2020-11-24 DIAGNOSIS — R79.89 ABNORMAL CBC: ICD-10-CM

## 2020-11-24 PROCEDURE — 3078F DIAST BP <80 MM HG: CPT | Performed by: FAMILY MEDICINE

## 2020-11-24 PROCEDURE — 3074F SYST BP LT 130 MM HG: CPT | Performed by: FAMILY MEDICINE

## 2020-11-24 PROCEDURE — 3008F BODY MASS INDEX DOCD: CPT | Performed by: FAMILY MEDICINE

## 2020-11-24 PROCEDURE — 99396 PREV VISIT EST AGE 40-64: CPT | Performed by: FAMILY MEDICINE

## 2020-11-24 NOTE — H&P
Edilia Yuen is a 54year old male who presents for a complete physical exam.   HPI:   Pt complains of right knee pain and seeing chiro -- recent MRI right knee wnl.         Wt Readings from Last 6 Encounters:  11/24/20 : 178 lb (80.7 kg)  02/20/20 : PHOSPHATASE 62 35 - 144 U/L    AST 34 10 - 35 U/L    ALT 23 9 - 46 U/L   TSH W REFLEX TO FREE T4   Result Value Ref Range    TSH W/REFLEX TO FT4 2.11 0.40 - 4.50 mIU/L   LIPID PANEL   Result Value Ref Range    CHOLESTEROL, TOTAL 169 <200 mg/dL    HDL SALINA Take 1,000 Units by mouth daily. • Multiple Vitamins-Minerals (EYE-LORENA EXTRA PLUS LUTEIN) Oral Tab Take by mouth.         No Known Allergies   Past Medical History:   Diagnosis Date   • Colon adenoma    • Goiter    • Viral warts       Past Surgical His kg/m².   GENERAL: well developed, well nourished,in no apparent distress  SKIN: no rashes,no suspicious lesions  HEENT: atraumatic, normocephalic,ears -fluid behind left TM and throat are clear, mild swollen turbs with clear drainage  EYES:PERRLA, EOMI,con

## 2020-12-04 ENCOUNTER — MED REC SCAN ONLY (OUTPATIENT)
Dept: FAMILY MEDICINE CLINIC | Facility: CLINIC | Age: 55
End: 2020-12-04

## 2020-12-28 RX ORDER — METHYLPREDNISOLONE 4 MG/1
TABLET ORAL
Qty: 21 TABLET | Refills: 0 | OUTPATIENT
Start: 2020-12-28

## 2021-02-16 ENCOUNTER — PATIENT MESSAGE (OUTPATIENT)
Dept: FAMILY MEDICINE CLINIC | Facility: CLINIC | Age: 56
End: 2021-02-16

## 2021-02-16 NOTE — TELEPHONE ENCOUNTER
From: Yanely Perez  To: Ahmad Aase, DO  Sent: 2/16/2021 4:26 PM CST  Subject: Visit Follow-up Question    Based on my lab results and checkup in Nov,  ordered two follow up tests to be completed in three months.  They are a \"CBC and vitamin d in

## 2021-02-27 LAB — HEMOGLOBIN A1C: 5 % OF TOTAL HGB

## 2021-03-01 DIAGNOSIS — E55.9 VITAMIN D DEFICIENCY: ICD-10-CM

## 2021-03-01 DIAGNOSIS — D72.9 ABNORMAL WBC COUNT: Primary | ICD-10-CM

## 2021-11-12 ENCOUNTER — OFFICE VISIT (OUTPATIENT)
Dept: FAMILY MEDICINE CLINIC | Facility: CLINIC | Age: 56
End: 2021-11-12
Payer: COMMERCIAL

## 2021-11-12 VITALS
SYSTOLIC BLOOD PRESSURE: 118 MMHG | RESPIRATION RATE: 18 BRPM | TEMPERATURE: 97 F | BODY MASS INDEX: 24.11 KG/M2 | HEIGHT: 72 IN | WEIGHT: 178 LBS | HEART RATE: 50 BPM | DIASTOLIC BLOOD PRESSURE: 74 MMHG

## 2021-11-12 DIAGNOSIS — R35.0 BENIGN PROSTATIC HYPERPLASIA WITH URINARY FREQUENCY: ICD-10-CM

## 2021-11-12 DIAGNOSIS — Z13.89 SCREENING FOR GENITOURINARY CONDITION: ICD-10-CM

## 2021-11-12 DIAGNOSIS — N40.1 BENIGN PROSTATIC HYPERPLASIA WITH URINARY FREQUENCY: ICD-10-CM

## 2021-11-12 DIAGNOSIS — Z00.00 ROUTINE GENERAL MEDICAL EXAMINATION AT A HEALTH CARE FACILITY: Primary | ICD-10-CM

## 2021-11-12 DIAGNOSIS — Z00.00 LABORATORY EXAMINATION ORDERED AS PART OF A ROUTINE GENERAL MEDICAL EXAMINATION: ICD-10-CM

## 2021-11-12 DIAGNOSIS — I73.00 RAYNAUD'S PHENOMENON WITHOUT GANGRENE: ICD-10-CM

## 2021-11-12 DIAGNOSIS — Z71.85 VACCINE COUNSELING: ICD-10-CM

## 2021-11-12 PROCEDURE — 3078F DIAST BP <80 MM HG: CPT | Performed by: FAMILY MEDICINE

## 2021-11-12 PROCEDURE — 3008F BODY MASS INDEX DOCD: CPT | Performed by: FAMILY MEDICINE

## 2021-11-12 PROCEDURE — 3074F SYST BP LT 130 MM HG: CPT | Performed by: FAMILY MEDICINE

## 2021-11-12 PROCEDURE — 99396 PREV VISIT EST AGE 40-64: CPT | Performed by: FAMILY MEDICINE

## 2021-11-12 NOTE — H&P
Bart Villa is a 64year old male who presents for a complete physical exam.   HPI:   Pt complains of nothing.         Wt Readings from Last 6 Encounters:  11/12/21 : 178 lb (80.7 kg)  11/24/20 : 178 lb (80.7 kg)  02/20/20 : 180 lb (81.6 kg)  02/11/2 VASECTOMY        Family History   Problem Relation Age of Onset   • Pacemaker Father    • Glaucoma Father    • Heart Disorder Father    • Glaucoma Mother    • Other (macular degeneration) Mother       Social History:  Social History    Tobacco Use      Smo good rectal tone, prostate shows no masses  with  enlargement  MUSCULOSKELETAL: back is not tender,FROM of the back; lipoma on ant.  Left shoulder  EXTREMITIES: no cyanosis, clubbing or edema  NEURO: Oriented times three,cranial nerves are intact,motor and

## 2022-12-13 LAB
% FREE PSA: 44 % (CALC)
ABSOLUTE BASOPHILS: 30 CELLS/UL (ref 0–200)
ABSOLUTE EOSINOPHILS: 41 CELLS/UL (ref 15–500)
ABSOLUTE LYMPHOCYTES: 710 CELLS/UL (ref 850–3900)
ABSOLUTE MONOCYTES: 311 CELLS/UL (ref 200–950)
ABSOLUTE NEUTROPHILS: 2609 CELLS/UL (ref 1500–7800)
ALBUMIN/GLOBULIN RATIO: 2.6 (CALC) (ref 1–2.5)
ALBUMIN: 4.7 G/DL (ref 3.6–5.1)
ALKALINE PHOSPHATASE: 56 U/L (ref 35–144)
ALT: 25 U/L (ref 9–46)
APPEARANCE: CLEAR
AST: 44 U/L (ref 10–35)
BASOPHILS: 0.8 %
BILIRUBIN, TOTAL: 4 MG/DL (ref 0.2–1.2)
BILIRUBIN: NEGATIVE
BUN: 19 MG/DL (ref 7–25)
CALCIUM: 9.5 MG/DL (ref 8.6–10.3)
CARBON DIOXIDE: 29 MMOL/L (ref 20–32)
CHLORIDE: 102 MMOL/L (ref 98–110)
CHOL/HDLC RATIO: 2.1 (CALC)
CHOLESTEROL, TOTAL: 176 MG/DL
COLOR: YELLOW
CREATININE: 1.26 MG/DL (ref 0.7–1.3)
EGFR: 67 ML/MIN/1.73M2
EOSINOPHILS: 1.1 %
FREE PSA: 0.4 NG/ML
GLOBULIN: 1.8 G/DL (CALC) (ref 1.9–3.7)
GLUCOSE: 98 MG/DL (ref 65–99)
GLUCOSE: NEGATIVE
HDL CHOLESTEROL: 82 MG/DL
HEMATOCRIT: 46.9 % (ref 38.5–50)
HEMOGLOBIN: 15.5 G/DL (ref 13.2–17.1)
KETONES: NEGATIVE
LDL-CHOLESTEROL: 80 MG/DL (CALC)
LEUKOCYTE ESTERASE: NEGATIVE
LYMPHOCYTES: 19.2 %
MCH: 32 PG (ref 27–33)
MCHC: 33 G/DL (ref 32–36)
MCV: 96.7 FL (ref 80–100)
MONOCYTES: 8.4 %
MPV: 10.7 FL (ref 7.5–12.5)
NEUTROPHILS: 70.5 %
NITRITE: NEGATIVE
NON-HDL CHOLESTEROL: 94 MG/DL (CALC)
OCCULT BLOOD: NEGATIVE
PH: 6 (ref 5–8)
PLATELET COUNT: 213 THOUSAND/UL (ref 140–400)
POTASSIUM: 4.3 MMOL/L (ref 3.5–5.3)
PROTEIN, TOTAL: 6.5 G/DL (ref 6.1–8.1)
PROTEIN: NEGATIVE
RDW: 11.7 % (ref 11–15)
RED BLOOD CELL COUNT: 4.85 MILLION/UL (ref 4.2–5.8)
SODIUM: 139 MMOL/L (ref 135–146)
SPECIFIC GRAVITY: 1.01 (ref 1–1.03)
TOTAL PSA: 0.9 NG/ML
TRIGLYCERIDES: 61 MG/DL
TSH W/REFLEX TO FT4: 2.32 MIU/L (ref 0.4–4.5)
VITAMIN D, 25-OH, TOTAL: 55 NG/ML (ref 30–100)
WHITE BLOOD CELL COUNT: 3.7 THOUSAND/UL (ref 3.8–10.8)

## 2022-12-15 DIAGNOSIS — R79.9 ABNORMAL BLOOD CHEMISTRY: Primary | ICD-10-CM

## 2022-12-15 DIAGNOSIS — R79.89 ABNORMAL CBC: ICD-10-CM

## 2022-12-19 ENCOUNTER — OFFICE VISIT (OUTPATIENT)
Dept: FAMILY MEDICINE CLINIC | Facility: CLINIC | Age: 57
End: 2022-12-19
Payer: COMMERCIAL

## 2022-12-19 VITALS
WEIGHT: 179 LBS | SYSTOLIC BLOOD PRESSURE: 120 MMHG | OXYGEN SATURATION: 99 % | DIASTOLIC BLOOD PRESSURE: 64 MMHG | RESPIRATION RATE: 18 BRPM | HEART RATE: 49 BPM | HEIGHT: 72 IN | BODY MASS INDEX: 24.24 KG/M2

## 2022-12-19 DIAGNOSIS — D72.810 LYMPHOPENIA: ICD-10-CM

## 2022-12-19 DIAGNOSIS — Z00.00 ROUTINE GENERAL MEDICAL EXAMINATION AT A HEALTH CARE FACILITY: Primary | ICD-10-CM

## 2022-12-19 DIAGNOSIS — R79.89 ELEVATED LFTS: ICD-10-CM

## 2022-12-19 DIAGNOSIS — Z71.85 VACCINE COUNSELING: ICD-10-CM

## 2022-12-19 PROCEDURE — 3074F SYST BP LT 130 MM HG: CPT | Performed by: FAMILY MEDICINE

## 2022-12-19 PROCEDURE — 3008F BODY MASS INDEX DOCD: CPT | Performed by: FAMILY MEDICINE

## 2022-12-19 PROCEDURE — 3078F DIAST BP <80 MM HG: CPT | Performed by: FAMILY MEDICINE

## 2022-12-19 PROCEDURE — 99396 PREV VISIT EST AGE 40-64: CPT | Performed by: FAMILY MEDICINE

## 2023-03-16 ENCOUNTER — PATIENT MESSAGE (OUTPATIENT)
Dept: FAMILY MEDICINE CLINIC | Facility: CLINIC | Age: 58
End: 2023-03-16

## 2023-03-16 NOTE — TELEPHONE ENCOUNTER
From: Roxana Aguirre  To: Sivan Wheat DO  Sent: 3/16/2023 12:49 PM CDT  Subject: Follow up blood work     Dr. Tanja Fernandes ordered the following blood work as a follow up to my annual physical in Dec 2022:   \"Repeat CBC and CMp in 1 month. Avoid alcohol 1 week prior to the test.\"  I have scheduled an appointment with [address] [place] [address] on 3/17. I noticed their Order Requisition Details (attached) appear to be different that those of Dr. Tanja Gomes. Please advise or provide [facility] with updated orders.    Thanks

## 2023-03-17 LAB
ABSOLUTE BASOPHILS: 30 CELLS/UL (ref 0–200)
ABSOLUTE EOSINOPHILS: 19 CELLS/UL (ref 15–500)
ABSOLUTE LYMPHOCYTES: 1006 CELLS/UL (ref 850–3900)
ABSOLUTE MONOCYTES: 389 CELLS/UL (ref 200–950)
ABSOLUTE NEUTROPHILS: 2257 CELLS/UL (ref 1500–7800)
ALBUMIN/GLOBULIN RATIO: 2.4 (CALC) (ref 1–2.5)
ALBUMIN: 4.3 G/DL (ref 3.6–5.1)
ALKALINE PHOSPHATASE: 54 U/L (ref 35–144)
ALT: 20 U/L (ref 9–46)
AST: 35 U/L (ref 10–35)
BASOPHILS: 0.8 %
BILIRUBIN, TOTAL: 3.6 MG/DL (ref 0.2–1.2)
BUN/CREATININE RATIO: 12 (CALC) (ref 6–22)
BUN: 16 MG/DL (ref 7–25)
CALCIUM: 9.3 MG/DL (ref 8.6–10.3)
CARBON DIOXIDE: 31 MMOL/L (ref 20–32)
CHLORIDE: 104 MMOL/L (ref 98–110)
CREATININE: 1.34 MG/DL (ref 0.7–1.3)
EGFR: 62 ML/MIN/1.73M2
EOSINOPHILS: 0.5 %
GLOBULIN: 1.8 G/DL (CALC) (ref 1.9–3.7)
GLUCOSE: 95 MG/DL (ref 65–99)
HEMATOCRIT: 44.1 % (ref 38.5–50)
HEMOGLOBIN: 14.6 G/DL (ref 13.2–17.1)
LYMPHOCYTES: 27.2 %
MCH: 32.5 PG (ref 27–33)
MCHC: 33.1 G/DL (ref 32–36)
MCV: 98.2 FL (ref 80–100)
MONOCYTES: 10.5 %
MPV: 10.8 FL (ref 7.5–12.5)
NEUTROPHILS: 61 %
PLATELET COUNT: 194 THOUSAND/UL (ref 140–400)
POTASSIUM: 4.5 MMOL/L (ref 3.5–5.3)
PROTEIN, TOTAL: 6.1 G/DL (ref 6.1–8.1)
RDW: 11.6 % (ref 11–15)
RED BLOOD CELL COUNT: 4.49 MILLION/UL (ref 4.2–5.8)
SODIUM: 141 MMOL/L (ref 135–146)
WHITE BLOOD CELL COUNT: 3.7 THOUSAND/UL (ref 3.8–10.8)

## 2023-11-03 ENCOUNTER — OFFICE VISIT (OUTPATIENT)
Dept: FAMILY MEDICINE CLINIC | Facility: CLINIC | Age: 58
End: 2023-11-03
Payer: COMMERCIAL

## 2023-11-03 VITALS
SYSTOLIC BLOOD PRESSURE: 130 MMHG | DIASTOLIC BLOOD PRESSURE: 80 MMHG | WEIGHT: 178 LBS | OXYGEN SATURATION: 99 % | RESPIRATION RATE: 18 BRPM | HEART RATE: 47 BPM | HEIGHT: 72 IN | BODY MASS INDEX: 24.11 KG/M2

## 2023-11-03 DIAGNOSIS — N40.1 BENIGN PROSTATIC HYPERPLASIA WITH URINARY FREQUENCY: ICD-10-CM

## 2023-11-03 DIAGNOSIS — Z23 NEED FOR VACCINATION: ICD-10-CM

## 2023-11-03 DIAGNOSIS — Z00.00 ROUTINE GENERAL MEDICAL EXAMINATION AT A HEALTH CARE FACILITY: Primary | ICD-10-CM

## 2023-11-03 DIAGNOSIS — Z12.11 SCREENING FOR COLON CANCER: ICD-10-CM

## 2023-11-03 DIAGNOSIS — Z13.89 SCREENING FOR GENITOURINARY CONDITION: ICD-10-CM

## 2023-11-03 DIAGNOSIS — R35.0 BENIGN PROSTATIC HYPERPLASIA WITH URINARY FREQUENCY: ICD-10-CM

## 2023-11-03 DIAGNOSIS — Z00.00 LABORATORY EXAMINATION ORDERED AS PART OF A ROUTINE GENERAL MEDICAL EXAMINATION: ICD-10-CM

## 2023-11-03 PROCEDURE — 90471 IMMUNIZATION ADMIN: CPT | Performed by: FAMILY MEDICINE

## 2023-11-03 PROCEDURE — 3079F DIAST BP 80-89 MM HG: CPT | Performed by: FAMILY MEDICINE

## 2023-11-03 PROCEDURE — 3008F BODY MASS INDEX DOCD: CPT | Performed by: FAMILY MEDICINE

## 2023-11-03 PROCEDURE — 3075F SYST BP GE 130 - 139MM HG: CPT | Performed by: FAMILY MEDICINE

## 2023-11-03 PROCEDURE — 90686 IIV4 VACC NO PRSV 0.5 ML IM: CPT | Performed by: FAMILY MEDICINE

## 2023-11-03 PROCEDURE — 99396 PREV VISIT EST AGE 40-64: CPT | Performed by: FAMILY MEDICINE

## 2023-11-07 LAB
ABSOLUTE BASOPHILS: 32 CELLS/UL (ref 0–200)
ABSOLUTE EOSINOPHILS: 60 CELLS/UL (ref 15–500)
ABSOLUTE LYMPHOCYTES: 896 CELLS/UL (ref 850–3900)
ABSOLUTE MONOCYTES: 319 CELLS/UL (ref 200–950)
ABSOLUTE NEUTROPHILS: 2195 CELLS/UL (ref 1500–7800)
ALBUMIN/GLOBULIN RATIO: 2.1 (CALC) (ref 1–2.5)
ALBUMIN: 4.2 G/DL (ref 3.6–5.1)
ALKALINE PHOSPHATASE: 57 U/L (ref 35–144)
ALT: 25 U/L (ref 9–46)
APPEARANCE: CLEAR
AST: 41 U/L (ref 10–35)
BASOPHILS: 0.9 %
BILIRUBIN, TOTAL: 3 MG/DL (ref 0.2–1.2)
BILIRUBIN: NEGATIVE
BUN: 16 MG/DL (ref 7–25)
CALCIUM: 9.4 MG/DL (ref 8.6–10.3)
CARBON DIOXIDE: 30 MMOL/L (ref 20–32)
CHLORIDE: 104 MMOL/L (ref 98–110)
CHOL/HDLC RATIO: 2.2 (CALC)
CHOLESTEROL, TOTAL: 163 MG/DL
COLOR: YELLOW
CREATININE: 1.18 MG/DL (ref 0.7–1.3)
EGFR: 72 ML/MIN/1.73M2
EOSINOPHILS: 1.7 %
GLOBULIN: 2 G/DL (CALC) (ref 1.9–3.7)
GLUCOSE: 96 MG/DL (ref 65–99)
GLUCOSE: NEGATIVE
HDL CHOLESTEROL: 73 MG/DL
HEMATOCRIT: 42 % (ref 38.5–50)
HEMOGLOBIN: 14.2 G/DL (ref 13.2–17.1)
KETONES: NEGATIVE
LDL-CHOLESTEROL: 76 MG/DL (CALC)
LEUKOCYTE ESTERASE: NEGATIVE
LYMPHOCYTES: 25.6 %
MCH: 32.6 PG (ref 27–33)
MCHC: 33.8 G/DL (ref 32–36)
MCV: 96.6 FL (ref 80–100)
MONOCYTES: 9.1 %
MPV: 10.7 FL (ref 7.5–12.5)
NEUTROPHILS: 62.7 %
NITRITE: NEGATIVE
NON-HDL CHOLESTEROL: 90 MG/DL (CALC)
OCCULT BLOOD: NEGATIVE
PH: 6 (ref 5–8)
PLATELET COUNT: 175 THOUSAND/UL (ref 140–400)
POTASSIUM: 4 MMOL/L (ref 3.5–5.3)
PROTEIN, TOTAL: 6.2 G/DL (ref 6.1–8.1)
PROTEIN: NEGATIVE
RDW: 11.6 % (ref 11–15)
RED BLOOD CELL COUNT: 4.35 MILLION/UL (ref 4.2–5.8)
SODIUM: 142 MMOL/L (ref 135–146)
SPECIFIC GRAVITY: 1.01 (ref 1–1.03)
TRIGLYCERIDES: 48 MG/DL
TSH W/REFLEX TO FT4: 1.88 MIU/L (ref 0.4–4.5)
VITAMIN D, 25-OH, TOTAL: 40 NG/ML (ref 30–100)
WHITE BLOOD CELL COUNT: 3.5 THOUSAND/UL (ref 3.8–10.8)

## 2023-11-21 DIAGNOSIS — R74.8 ELEVATED LIVER ENZYMES: Primary | ICD-10-CM

## 2024-01-08 ENCOUNTER — OFFICE VISIT (OUTPATIENT)
Dept: SURGERY | Facility: CLINIC | Age: 59
End: 2024-01-08

## 2024-01-08 ENCOUNTER — LAB ENCOUNTER (OUTPATIENT)
Dept: LAB | Facility: HOSPITAL | Age: 59
End: 2024-01-08
Attending: INTERNAL MEDICINE
Payer: COMMERCIAL

## 2024-01-08 VITALS
RESPIRATION RATE: 16 BRPM | OXYGEN SATURATION: 100 % | SYSTOLIC BLOOD PRESSURE: 156 MMHG | DIASTOLIC BLOOD PRESSURE: 79 MMHG | HEART RATE: 56 BPM | WEIGHT: 187.81 LBS | TEMPERATURE: 97 F | BODY MASS INDEX: 25 KG/M2

## 2024-01-08 DIAGNOSIS — R74.8 ELEVATED LIVER ENZYMES: Primary | ICD-10-CM

## 2024-01-08 LAB
AFP-TM SERPL-MCNC: 6 NG/ML
ALBUMIN SERPL-MCNC: 4.6 G/DL (ref 3.2–4.8)
ALBUMIN/GLOB SERPL: 1.8 {RATIO} (ref 1–2)
ALP LIVER SERPL-CCNC: 64 U/L
ALT SERPL-CCNC: 23 U/L
ANION GAP SERPL CALC-SCNC: 3 MMOL/L (ref 0–18)
AST SERPL-CCNC: 42 U/L (ref ?–34)
BASOPHILS # BLD AUTO: 0.02 X10(3) UL (ref 0–0.2)
BASOPHILS NFR BLD AUTO: 0.3 %
BILIRUB SERPL-MCNC: 3.1 MG/DL (ref 0.3–1.2)
BUN BLD-MCNC: 18 MG/DL (ref 9–23)
BUN/CREAT SERPL: 15.3 (ref 10–20)
CALCIUM BLD-MCNC: 9.5 MG/DL (ref 8.7–10.4)
CANCER AG19-9 SERPL-ACNC: 21.5 U/ML (ref ?–35)
CEA SERPL-MCNC: 11.6 NG/ML (ref ?–5)
CHLORIDE SERPL-SCNC: 106 MMOL/L (ref 98–112)
CO2 SERPL-SCNC: 31 MMOL/L (ref 21–32)
CREAT BLD-MCNC: 1.18 MG/DL
DEPRECATED HBV CORE AB SER IA-ACNC: 148.8 NG/ML
DEPRECATED RDW RBC AUTO: 39.8 FL (ref 35.1–46.3)
EGFRCR SERPLBLD CKD-EPI 2021: 72 ML/MIN/1.73M2 (ref 60–?)
EOSINOPHIL # BLD AUTO: 0.01 X10(3) UL (ref 0–0.7)
EOSINOPHIL NFR BLD AUTO: 0.2 %
ERYTHROCYTE [DISTWIDTH] IN BLOOD BY AUTOMATED COUNT: 11.4 % (ref 11–15)
FASTING STATUS PATIENT QL REPORTED: NO
GLOBULIN PLAS-MCNC: 2.5 G/DL (ref 2.8–4.4)
GLUCOSE BLD-MCNC: 92 MG/DL (ref 70–99)
HAV AB SER QL IA: REACTIVE
HAV IGM SER QL: NONREACTIVE
HBV CORE AB SERPL QL IA: NONREACTIVE
HBV SURFACE AB SER QL: REACTIVE
HBV SURFACE AB SERPL IA-ACNC: 593.93 MIU/ML
HBV SURFACE AG SER-ACNC: <0.1 [IU]/L
HBV SURFACE AG SERPL QL IA: NONREACTIVE
HCT VFR BLD AUTO: 44.8 %
HCV AB SERPL QL IA: NONREACTIVE
HGB BLD-MCNC: 15 G/DL
IGA SERPL-MCNC: 159.9 MG/DL (ref 70–312)
IGM SERPL-MCNC: 111.8 MG/DL (ref 50–300)
IMM GRANULOCYTES # BLD AUTO: 0.01 X10(3) UL (ref 0–1)
IMM GRANULOCYTES NFR BLD: 0.2 %
IMMUNOGLOBULIN PNL SER-MCNC: 853 MG/DL (ref 650–1600)
INR BLD: 0.99 (ref 0.8–1.2)
IRON SATN MFR SERPL: 24 %
IRON SERPL-MCNC: 89 UG/DL
LYMPHOCYTES # BLD AUTO: 1.16 X10(3) UL (ref 1–4)
LYMPHOCYTES NFR BLD AUTO: 18 %
MCH RBC QN AUTO: 31.8 PG (ref 26–34)
MCHC RBC AUTO-ENTMCNC: 33.5 G/DL (ref 31–37)
MCV RBC AUTO: 95.1 FL
MONOCYTES # BLD AUTO: 0.41 X10(3) UL (ref 0.1–1)
MONOCYTES NFR BLD AUTO: 6.4 %
NEUTROPHILS # BLD AUTO: 4.83 X10 (3) UL (ref 1.5–7.7)
NEUTROPHILS # BLD AUTO: 4.83 X10(3) UL (ref 1.5–7.7)
NEUTROPHILS NFR BLD AUTO: 74.9 %
OSMOLALITY SERPL CALC.SUM OF ELEC: 292 MOSM/KG (ref 275–295)
PLATELET # BLD AUTO: 214 10(3)UL (ref 150–450)
POTASSIUM SERPL-SCNC: 3.9 MMOL/L (ref 3.5–5.1)
PROT SERPL-MCNC: 7.1 G/DL (ref 5.7–8.2)
PROTHROMBIN TIME: 13.7 SECONDS (ref 11.6–14.8)
RBC # BLD AUTO: 4.71 X10(6)UL
SODIUM SERPL-SCNC: 140 MMOL/L (ref 136–145)
TIBC SERPL-MCNC: 365 UG/DL (ref 250–425)
TRANSFERRIN SERPL-MCNC: 245 MG/DL (ref 215–365)
WBC # BLD AUTO: 6.4 X10(3) UL (ref 4–11)

## 2024-01-08 PROCEDURE — 86301 IMMUNOASSAY TUMOR CA 19-9: CPT | Performed by: INTERNAL MEDICINE

## 2024-01-08 PROCEDURE — 83516 IMMUNOASSAY NONANTIBODY: CPT

## 2024-01-08 PROCEDURE — 86708 HEPATITIS A ANTIBODY: CPT | Performed by: INTERNAL MEDICINE

## 2024-01-08 PROCEDURE — 82784 ASSAY IGA/IGD/IGG/IGM EACH: CPT | Performed by: INTERNAL MEDICINE

## 2024-01-08 PROCEDURE — 86706 HEP B SURFACE ANTIBODY: CPT | Performed by: INTERNAL MEDICINE

## 2024-01-08 PROCEDURE — 36415 COLL VENOUS BLD VENIPUNCTURE: CPT | Performed by: INTERNAL MEDICINE

## 2024-01-08 PROCEDURE — 86038 ANTINUCLEAR ANTIBODIES: CPT

## 2024-01-08 PROCEDURE — 83540 ASSAY OF IRON: CPT | Performed by: INTERNAL MEDICINE

## 2024-01-08 PROCEDURE — 86364 TISS TRNSGLTMNASE EA IG CLAS: CPT

## 2024-01-08 PROCEDURE — 82784 ASSAY IGA/IGD/IGG/IGM EACH: CPT

## 2024-01-08 PROCEDURE — 86225 DNA ANTIBODY NATIVE: CPT

## 2024-01-08 PROCEDURE — 86803 HEPATITIS C AB TEST: CPT | Performed by: INTERNAL MEDICINE

## 2024-01-08 PROCEDURE — 82105 ALPHA-FETOPROTEIN SERUM: CPT | Performed by: INTERNAL MEDICINE

## 2024-01-08 PROCEDURE — 87340 HEPATITIS B SURFACE AG IA: CPT | Performed by: INTERNAL MEDICINE

## 2024-01-08 PROCEDURE — 82103 ALPHA-1-ANTITRYPSIN TOTAL: CPT

## 2024-01-08 PROCEDURE — 86709 HEPATITIS A IGM ANTIBODY: CPT | Performed by: INTERNAL MEDICINE

## 2024-01-08 PROCEDURE — 82104 ALPHA-1-ANTITRYPSIN PHENO: CPT

## 2024-01-08 PROCEDURE — 85610 PROTHROMBIN TIME: CPT | Performed by: INTERNAL MEDICINE

## 2024-01-08 PROCEDURE — 82378 CARCINOEMBRYONIC ANTIGEN: CPT | Performed by: INTERNAL MEDICINE

## 2024-01-08 PROCEDURE — 82728 ASSAY OF FERRITIN: CPT | Performed by: INTERNAL MEDICINE

## 2024-01-08 PROCEDURE — 84466 ASSAY OF TRANSFERRIN: CPT | Performed by: INTERNAL MEDICINE

## 2024-01-08 PROCEDURE — 80053 COMPREHEN METABOLIC PANEL: CPT | Performed by: INTERNAL MEDICINE

## 2024-01-08 PROCEDURE — 86704 HEP B CORE ANTIBODY TOTAL: CPT | Performed by: INTERNAL MEDICINE

## 2024-01-08 PROCEDURE — 85025 COMPLETE CBC W/AUTO DIFF WBC: CPT | Performed by: INTERNAL MEDICINE

## 2024-01-08 PROCEDURE — 82390 ASSAY OF CERULOPLASMIN: CPT | Performed by: INTERNAL MEDICINE

## 2024-01-08 PROCEDURE — 80321 ALCOHOLS BIOMARKERS 1OR 2: CPT | Performed by: INTERNAL MEDICINE

## 2024-01-09 LAB
ACTIN SMOOTH MUSCLE AB: 7 UNITS
CERULOPLASMIN SERPL-MCNC: 23.6 MG/DL (ref 20–60)
DSDNA IGG SERPL IA-ACNC: 2.5 IU/ML
ENA AB SER QL IA: <0.09 UG/L
ENA AB SER QL IA: NEGATIVE
M2 MITOCHONDRIAL AB: <20 UNITS
TTG IGA SER-ACNC: 0.5 U/ML (ref ?–7)

## 2024-01-10 LAB — A-1-ANTITRYPSIN: 141 MG/DL

## 2024-01-14 LAB
PHOSPHATIDYETHANOL: POSITIVE
PHOSPHATIDYLETHANOL (PETH): 22 NG/ML

## 2024-02-05 ENCOUNTER — OFFICE VISIT (OUTPATIENT)
Dept: SURGERY | Facility: CLINIC | Age: 59
End: 2024-02-05

## 2024-02-05 VITALS
HEART RATE: 47 BPM | BODY MASS INDEX: 24 KG/M2 | WEIGHT: 178.63 LBS | RESPIRATION RATE: 16 BRPM | DIASTOLIC BLOOD PRESSURE: 83 MMHG | OXYGEN SATURATION: 100 % | SYSTOLIC BLOOD PRESSURE: 153 MMHG | TEMPERATURE: 98 F

## 2024-02-05 DIAGNOSIS — R74.01 ELEVATED AST (SGOT): Primary | ICD-10-CM

## 2024-02-12 LAB
ALBUMIN/GLOBULIN RATIO: 2.1 (CALC) (ref 1–2.5)
ALBUMIN: 4.4 G/DL (ref 3.6–5.1)
ALKALINE PHOSPHATASE: 58 U/L (ref 35–144)
ALT: 21 U/L (ref 9–46)
AST: 34 U/L (ref 10–35)
BILIRUBIN, DIRECT: 0.4 MG/DL
BILIRUBIN, INDIRECT: 2.1 MG/DL (CALC) (ref 0.2–1.2)
BILIRUBIN, TOTAL: 2.5 MG/DL (ref 0.2–1.2)
CREATINE KINASE, TOTAL: 262 U/L (ref 44–196)
GLOBULIN: 2.1 G/DL (CALC) (ref 1.9–3.7)
PROTEIN, TOTAL: 6.5 G/DL (ref 6.1–8.1)

## 2024-02-26 ENCOUNTER — PATIENT MESSAGE (OUTPATIENT)
Dept: FAMILY MEDICINE CLINIC | Facility: CLINIC | Age: 59
End: 2024-02-26

## 2024-02-26 NOTE — TELEPHONE ENCOUNTER
From: Tomasz Young  To: Michelle Guillen  Sent: 2/26/2024 4:10 PM CST  Subject: High AST Blood Level - Testing Complete    Hi Dr. Guillen,   I completed the prescribed tests (bloodwork and MRI) from Dr. Tomas based on my elevated AST levels. Dr. Tomas relayed to me; \"We reviewed the MRI images in our conference. Briefly, no worrisome/cancer-appearing lesions in the liver or the bile ducts. You do have cysts on your liver and kidney. 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 It is rarely associated with liver dysfunction, though kidney dysfunction is fairly common and can be progressive. One of the cysts on your right kidney does have a \"complex\" appearance, which typically occurs if there is a slight bleeding into the cyst. It will be safe, however, for us to repeat imaging in 1 year to ensure stability and no progression to worrisome lesions on the kidney.   APLKD is also associated with brain aneurysms, for which I will recommend a one-time screening with an MRI of the brain.  These imaging studies may be carried out under the guidance of your primary care provider if you prefer.\"   Dr. Tomas and I agreed since the potential issues raised by the MRI are kidney related (not her specialty) that further care/guidance could be reverted back to you. I will see her again as suggested for another liver MRI in one year. Do you concur with the suggested brain scan? Also, do you concur with seeing a kidney specialist and if so do you have a recommendation? Thanks, Tomasz

## 2024-04-22 RX ORDER — COLLAGEN, HYDROLYSATE (BOVINE) 100 %
POWDER (GRAM) MISCELLANEOUS
COMMUNITY
Start: 2024-01-20

## 2024-04-22 RX ORDER — POLYETHYLENE GLYCOL-3350 AND ELECTROLYTES 236; 6.74; 5.86; 2.97; 22.74 G/274.31G; G/274.31G; G/274.31G; G/274.31G; G/274.31G
POWDER, FOR SOLUTION ORAL ONCE
COMMUNITY
Start: 2023-11-10

## 2024-04-23 ENCOUNTER — OFFICE VISIT (OUTPATIENT)
Dept: FAMILY MEDICINE CLINIC | Facility: CLINIC | Age: 59
End: 2024-04-23
Payer: COMMERCIAL

## 2024-04-23 VITALS
DIASTOLIC BLOOD PRESSURE: 76 MMHG | BODY MASS INDEX: 24.55 KG/M2 | HEIGHT: 72 IN | RESPIRATION RATE: 16 BRPM | OXYGEN SATURATION: 100 % | WEIGHT: 181.25 LBS | SYSTOLIC BLOOD PRESSURE: 138 MMHG | HEART RATE: 49 BPM

## 2024-04-23 DIAGNOSIS — Q44.6 AUTOSOMAL DOMINANT POLYCYSTIC LIVER DISEASE: ICD-10-CM

## 2024-04-23 DIAGNOSIS — E80.4 GILBERT DISEASE: ICD-10-CM

## 2024-04-23 DIAGNOSIS — R79.89 ELEVATED LFTS: Primary | ICD-10-CM

## 2024-04-23 DIAGNOSIS — Q44.6 PLD (POLYCYSTIC LIVER DISEASE): ICD-10-CM

## 2024-04-23 DIAGNOSIS — Q61.3 PKD (POLYCYSTIC KIDNEY DISEASE): ICD-10-CM

## 2024-04-23 DIAGNOSIS — E80.6 DISORDER OF BILIRUBIN EXCRETION: ICD-10-CM

## 2024-04-23 PROBLEM — Q61.2 ADPKD (AUTOSOMAL DOMINANT POLYCYSTIC KIDNEY DISEASE): Status: ACTIVE | Noted: 2024-04-23

## 2024-04-23 PROCEDURE — 99214 OFFICE O/P EST MOD 30 MIN: CPT | Performed by: FAMILY MEDICINE

## 2024-04-23 PROCEDURE — 96127 BRIEF EMOTIONAL/BEHAV ASSMT: CPT | Performed by: FAMILY MEDICINE

## 2024-04-23 NOTE — PROGRESS NOTES
Tomasz Young is a 58 year old male.  HPI:   Pt. Seeing hepatology in [facility].  Had MRI abdomen 1/26/24 with Dr. Tomas.  Shows PKD and PLD.  Sis had bile duct cancer.  She passed of it 2 years ago -- 66 years   Dr. Tomas sent MRI brain W/WO contrast to [facility].  Advised 1 year follow up MRI liver and kidneys.  Meds reviewed.      Current Outpatient Medications   Medication Sig Dispense Refill    GAVILYTE-G 236 g Oral Recon Soln Take by mouth one time.      Collagen Hydrolysate Does not apply Powder       Creatine Oral Powder       Cholecalciferol (VITAMIN D) 1000 units Oral Tab Take 2,000 Units by mouth daily.      Multiple Vitamins-Minerals (EYE-LORENA EXTRA PLUS LUTEIN) Oral Tab Take by mouth.       No current facility-administered medications for this visit.      Allergies   Allergen Reactions    Dust OTHER (SEE COMMENTS)      Past Medical History:    Colon adenoma    Goiter    Viral warts      Social History:  Social History     Socioeconomic History    Marital status:    Tobacco Use    Smoking status: Never    Smokeless tobacco: Never   Vaping Use    Vaping status: Never Used   Substance and Sexual Activity    Alcohol use: Yes     Alcohol/week: 3.0 - 6.0 standard drinks of alcohol     Types: 3 - 6 Standard drinks or equivalent per week    Drug use: No    Sexual activity: Yes   Other Topics Concern    Caffeine Concern Yes     Comment: 3x a quarter, tea 3x/wk    Exercise Yes     Comment: 3x week     Seat Belt Yes        Results for orders placed or performed in visit on 02/05/24   Creatine Kinase (CK) (Not Creatinine)   Result Value Ref Range    CREATINE KINASE, TOTAL 262 (H) 44 - 196 U/L   Hepatic Function Panel (7)   Result Value Ref Range    PROTEIN, TOTAL 6.5 6.1 - 8.1 g/dL    ALBUMIN 4.4 3.6 - 5.1 g/dL    GLOBULIN 2.1 1.9 - 3.7 g/dL (calc)    ALBUMIN/GLOBULIN RATIO 2.1 1.0 - 2.5 (calc)    BILIRUBIN, TOTAL 2.5 (H) 0.2 - 1.2 mg/dL    BILIRUBIN, DIRECT 0.4 (H) < OR = 0.2 mg/dL    BILIRUBIN, INDIRECT  2.1 (H) 0.2 - 1.2 mg/dL (calc)    ALKALINE PHOSPHATASE 58 35 - 144 U/L    AST 34 10 - 35 U/L    ALT 21 9 - 46 U/L       REVIEW OF SYSTEMS:   GENERAL: feels well otherwise  SKIN: denies any unusual skin lesions  LUNGS: denies shortness of breath with exertion  CARDIOVASCULAR: denies chest pain on exertion  GI: denies abdominal pain,denies heartburn  MUSCULOSKELETAL: denies back pain  EXTREMITIES:  No pain or numbness    EXAM:   /76   Pulse (!) 49   Resp 16   Ht 6' (1.829 m)   Wt 181 lb 4 oz (82.2 kg)   SpO2 100%   BMI 24.58 kg/m²   GENERAL: well developed, well nourished,in no apparent distress  SKIN: no rashes,no suspicious lesions  HEENT: atraumatic, normocephalic  NECK: supple,no adenopathy,no bruits  LUNGS: clear to auscultation  CARDIO: RRR without murmur  GI: soft, good BS's; no HSM  EXTREMITIES: no cyanosis, clubbing, tr. edema    ASSESSMENT AND PLAN:     Encounter Diagnoses   Name Primary?    Elevated LFTs Yes    Disorder of bilirubin excretion     PKD (polycystic kidney disease)     PLD (polycystic liver disease)     Autosomal dominant polycystic liver disease     Gilbert disease        No orders of the defined types were placed in this encounter.      Meds & Refills for this Visit:  Requested Prescriptions      No prescriptions requested or ordered in this encounter       Imaging & Consults:  None      Monitor BP.  Low salt diet.  BP machine -- bring machine in.  Cut down on chips and salsa.  Will do MRI brain per Dr. Tomas.        The patient indicates understanding of these issues and agrees to the plan.  Return in about 2 months (around 6/23/2024) for BP check.

## 2024-04-30 ENCOUNTER — PATIENT MESSAGE (OUTPATIENT)
Dept: FAMILY MEDICINE CLINIC | Facility: CLINIC | Age: 59
End: 2024-04-30

## 2024-04-30 DIAGNOSIS — Q44.6 AUTOSOMAL DOMINANT POLYCYSTIC LIVER DISEASE: ICD-10-CM

## 2024-04-30 DIAGNOSIS — Q61.3 PKD (POLYCYSTIC KIDNEY DISEASE): Primary | ICD-10-CM

## 2024-04-30 NOTE — TELEPHONE ENCOUNTER
Last OV: 4/23/24 elevated lft               Next OV:  6/26/24 follow up test results    Please see pt's mychart message and request for nephrologist.  Referral pended for your review?

## 2024-04-30 NOTE — TELEPHONE ENCOUNTER
From: Tomasz Young  To: Michelle Guillen  Sent: 4/30/2024 2:29 PM CDT  Subject: April 23rd Visit Follow Up Question    Hello Dr. Guillen,    At my recent visit I forgot to ask; do you have a recommendation for a Nephrology specialist?    Also, I asked Dr. Tomas to explain the acronym she used; ADLKD. She confirmed (as we thought) it refers to a combination of liver and kidney disease: autosomal dominant polycystic liver and kidney disease.    Thanks,  Tomasz

## 2024-06-26 ENCOUNTER — OFFICE VISIT (OUTPATIENT)
Dept: FAMILY MEDICINE CLINIC | Facility: CLINIC | Age: 59
End: 2024-06-26

## 2024-06-26 VITALS
BODY MASS INDEX: 24.79 KG/M2 | WEIGHT: 183 LBS | OXYGEN SATURATION: 97 % | HEIGHT: 72 IN | RESPIRATION RATE: 16 BRPM | HEART RATE: 55 BPM | SYSTOLIC BLOOD PRESSURE: 132 MMHG | DIASTOLIC BLOOD PRESSURE: 70 MMHG

## 2024-06-26 DIAGNOSIS — R03.0 ELEVATED BP WITHOUT DIAGNOSIS OF HYPERTENSION: Primary | ICD-10-CM

## 2024-06-26 DIAGNOSIS — Z13.89 SCREENING FOR GENITOURINARY CONDITION: ICD-10-CM

## 2024-06-26 DIAGNOSIS — Z00.00 LABORATORY EXAMINATION ORDERED AS PART OF A ROUTINE GENERAL MEDICAL EXAMINATION: ICD-10-CM

## 2024-06-26 PROCEDURE — 99213 OFFICE O/P EST LOW 20 MIN: CPT | Performed by: FAMILY MEDICINE

## 2024-06-27 NOTE — PROGRESS NOTES
Tomasz Young is a 58 year old male.  HPI:   Patient is here for follow-up on his blood pressure.  He did bring his blood pressure machine in today.  Patient has 20 readings on his machine and 20% of them were very minimally high in the low 130s over 70s.  His blood pressure today in the office was 132/70 and his blood pressure on his machine was 131/77.  Patient is very active, weight is stable, he does state he eats a lot of salsa and chips.  Otherwise he does not use a lot of salt in his food.  Current Outpatient Medications   Medication Sig Dispense Refill    GAVILYTE-G 236 g Oral Recon Soln Take by mouth one time.      Collagen Hydrolysate Does not apply Powder       Creatine Oral Powder       Cholecalciferol (VITAMIN D) 1000 units Oral Tab Take 2,000 Units by mouth daily.      Multiple Vitamins-Minerals (EYE-LORENA EXTRA PLUS LUTEIN) Oral Tab Take by mouth.       No current facility-administered medications for this visit.      Allergies   Allergen Reactions    Dust OTHER (SEE COMMENTS)      Past Medical History:    Colon adenoma    Goiter    Viral warts      Social History:  Social History     Socioeconomic History    Marital status:    Tobacco Use    Smoking status: Never    Smokeless tobacco: Never   Vaping Use    Vaping status: Never Used   Substance and Sexual Activity    Alcohol use: Yes     Alcohol/week: 3.0 - 6.0 standard drinks of alcohol     Types: 3 - 6 Standard drinks or equivalent per week    Drug use: No    Sexual activity: Yes   Other Topics Concern    Caffeine Concern Yes     Comment: 3x a quarter, tea 3x/wk    Exercise Yes     Comment: 3x week     Seat Belt Yes        Results for orders placed or performed in visit on 02/05/24   Creatine Kinase (CK) (Not Creatinine)   Result Value Ref Range    CREATINE KINASE, TOTAL 262 (H) 44 - 196 U/L   Hepatic Function Panel (7)   Result Value Ref Range    PROTEIN, TOTAL 6.5 6.1 - 8.1 g/dL    ALBUMIN 4.4 3.6 - 5.1 g/dL    GLOBULIN 2.1 1.9 - 3.7  g/dL (calc)    ALBUMIN/GLOBULIN RATIO 2.1 1.0 - 2.5 (calc)    BILIRUBIN, TOTAL 2.5 (H) 0.2 - 1.2 mg/dL    BILIRUBIN, DIRECT 0.4 (H) < OR = 0.2 mg/dL    BILIRUBIN, INDIRECT 2.1 (H) 0.2 - 1.2 mg/dL (calc)    ALKALINE PHOSPHATASE 58 35 - 144 U/L    AST 34 10 - 35 U/L    ALT 21 9 - 46 U/L       REVIEW OF SYSTEMS:   GENERAL: feels well otherwise  SKIN: denies any unusual skin lesions  LUNGS: denies shortness of breath with exertion  CARDIOVASCULAR: denies chest pain on exertion  GI: denies abdominal pain,denies heartburn  MUSCULOSKELETAL: denies back pain  EXTREMITIES:  No pain or numbness    EXAM:   /70 (BP Location: Left arm, Patient Position: Sitting, Cuff Size: adult)   Pulse 55   Resp 16   Ht 6' (1.829 m)   Wt 183 lb (83 kg)   SpO2 97%   BMI 24.82 kg/m²   GENERAL: well developed, well nourished,in no apparent distress  HEENT: atraumatic, normocephalic  NECK: supple,no adenopathy,no bruits  LUNGS: clear to auscultation  CARDIO: RRR without murmur  EXTREMITIES: no cyanosis, clubbing or edema    ASSESSMENT AND PLAN:     Encounter Diagnoses   Name Primary?    Elevated BP without diagnosis of hypertension Yes    Laboratory examination ordered as part of a routine general medical examination     Screening for genitourinary condition        Orders Placed This Encounter   Procedures    CBC [6399] [Q]    COMP METABOLIC PANEL [71875] [Q]    LIPID PANEL [7600] [Q]    TSH W REFLEX TO FREE T4 [15302][Q]    VITAMIN D, 25-HYDROXY [65332][Q]    URINALYSIS, ROUTINE [8053][Q]    PSA, TOTAL AND FREE (DIAGNOSTIC) [23676] [Q]       Meds & Refills for this Visit:  Requested Prescriptions      No prescriptions requested or ordered in this encounter       Imaging & Consults:  None    Advised to continually occasionally monitor his blood pressure.  Currently he is doing well.  Advise low-salt diet.  Continue to stay active and focus on diet and exercise.  Work ordered for the fall.    The patient indicates understanding of these  issues and agrees to the plan.  Return in about 5 months (around 11/26/2024) for physical.

## 2024-07-11 ENCOUNTER — MED REC SCAN ONLY (OUTPATIENT)
Dept: NEPHROLOGY | Facility: CLINIC | Age: 59
End: 2024-07-11

## 2024-07-15 ENCOUNTER — OFFICE VISIT (OUTPATIENT)
Dept: NEPHROLOGY | Facility: CLINIC | Age: 59
End: 2024-07-15
Payer: COMMERCIAL

## 2024-07-15 VITALS — DIASTOLIC BLOOD PRESSURE: 72 MMHG | WEIGHT: 181.13 LBS | SYSTOLIC BLOOD PRESSURE: 126 MMHG | BODY MASS INDEX: 25 KG/M2

## 2024-07-15 DIAGNOSIS — K76.89 HEPATIC CYST: ICD-10-CM

## 2024-07-15 DIAGNOSIS — N28.1 RENAL CYST: Primary | ICD-10-CM

## 2024-07-15 DIAGNOSIS — E80.4 GILBERT SYNDROME: ICD-10-CM

## 2024-07-15 NOTE — PROGRESS NOTES
Nephrology Consult Note    REASON FOR CONSULT: Renal cysts    ASSESSMENT/PLAN:      1) Renal cysts- recently found to have multiple small, subcentimeter nonenhancing renal lesions suggestive of cysts on abdominal MRI for evaluation of possible biliary tract disease; there is a possible cluster of small cysts versus a nonenhancing somewhat complex cyst unilaterally.  There are also several very small hepatic lesions, possibly cysts and raise a possibility of autosomal dominant polycystic kidney disease.  Routine labs are otherwise unremarkable with preserved renal function and bland urine sediment.  There is no history of UTIs, nephrolithiasis.  There is no family history of kidney disease; 1 older sister recently passed away from cholangiocarcinoma; parents have lived into their 90s.  He does not take any prescription medications.  He is otherwise in extremely good health and works out daily. PLAN- d/w pt at length.  Findings are not consistent with polycystic kidney disease which generally manifest with progressive renal dysfunction, innumerable renal cysts and nephromegaly in addition to + family history. Pt to drop off MRI disc to be downloaded at [facility]- will review with body imaging radiologist.    2) Gilbert's syndrome- bili approx 3       HPI:   Tomasz Young is a 58 year old male with   Chief Complaint   Patient presents with    Consult     Ref'd by Dr. Guillen for PKD and renal lesions     Michelle Guillen, DO    Very pleasant 58-year-old male presents for evaluation of renal cysts found incidentally as above.  Is otherwise in excellent health without history of acute or chronic renal failure gross microscopic hematuria proteinuria kidney stones or infections.  He is on no prescription medications.  No he has never been hospitalized.  She is no history of cardiac pulmonary or hepatic disease.  No major surgery.  He is otherwise extremely active and exercises daily.  No recreational drug use.  He works as a  analyst.  No occupational hazards.    ROS:    Denies fever/chills  Denies wt loss/gain  Denies HA or visual changes  Denies CP or palpitations  Denies SOB/cough/hemoptysis  Denies abd or flank pain  Denies N/V/D  Denies change in urinary habits or gross hematuria  Denies LE edema  Denies skin rashes/myalgias/arthralgias    PMH:  Past Medical History:    Colon adenoma    Goiter    Viral warts       PSH:  Past Surgical History:   Procedure Laterality Date    Colonoscopy      Colonoscopy N/A 2/19/2016    Procedure: COLONOSCOPY;  Surgeon: Williams Melissa MD;  Location:  ENDOSCOPY    Colonoscopy N/A 3/11/2019    Procedure: COLONOSCOPY;  Surgeon: Williams Melissa MD;  Location:  ENDOSCOPY    Vasectomy         Medications (Active prior to today's visit):  Current Outpatient Medications   Medication Sig Dispense Refill    GAVILYTE-G 236 g Oral Recon Soln Take by mouth one time.      Collagen Hydrolysate Does not apply Powder       Creatine Oral Powder       Cholecalciferol (VITAMIN D) 1000 units Oral Tab Take 2,000 Units by mouth daily.      Multiple Vitamins-Minerals (EYE-LORENA EXTRA PLUS LUTEIN) Oral Tab Take by mouth.         Allergies:  Allergies   Allergen Reactions    Dust OTHER (SEE COMMENTS)       Social History:  Social History     Socioeconomic History    Marital status:    Tobacco Use    Smoking status: Never    Smokeless tobacco: Never   Vaping Use    Vaping status: Never Used   Substance and Sexual Activity    Alcohol use: Yes     Alcohol/week: 3.0 - 6.0 standard drinks of alcohol     Types: 3 - 6 Standard drinks or equivalent per week    Drug use: No    Sexual activity: Yes   Other Topics Concern    Caffeine Concern Yes     Comment: 3x a quarter, tea 3x/wk    Exercise Yes     Comment: 3x week     Seat Belt Yes        Family History:  Denies family history of kidney disease.    PHYSICAL EXAM:   /72 (BP Location: Left arm, Patient Position: Sitting)   Wt 181 lb 2 oz (82.2 kg)   BMI 24.56 kg/m²     Wt Readings from Last 3 Encounters:   07/15/24 181 lb 2 oz (82.2 kg)   06/26/24 183 lb (83 kg)   04/23/24 181 lb 4 oz (82.2 kg)     General: Alert and oriented in no apparent distress.  HEENT: No scleral icterus, MMM  Neck: Supple, no BRUCE or thyromegaly  Cardiac: Regular rate and rhythm, S1, S2 normal, no murmur or rub  Lungs: Clear without wheezes, rales, rhonchi.    Abdomen: Soft, non-tender. + bowel sounds, no palpable organomegaly  Extremities: Without clubbing, cyanosis or edema.  Neurologic:  normal affect, cranial nerves grossly intact, moving all extremities  Skin: Warm and dry, no rashes        Selma Brandt MD  7/15/2024  3:03 PM

## 2024-11-04 ENCOUNTER — OFFICE VISIT (OUTPATIENT)
Dept: FAMILY MEDICINE CLINIC | Facility: CLINIC | Age: 59
End: 2024-11-04
Payer: COMMERCIAL

## 2024-11-04 VITALS
BODY MASS INDEX: 24.8 KG/M2 | SYSTOLIC BLOOD PRESSURE: 118 MMHG | DIASTOLIC BLOOD PRESSURE: 66 MMHG | HEIGHT: 71.25 IN | RESPIRATION RATE: 16 BRPM | HEART RATE: 48 BPM | OXYGEN SATURATION: 100 % | WEIGHT: 179.13 LBS

## 2024-11-04 DIAGNOSIS — Q61.3 PKD (POLYCYSTIC KIDNEY DISEASE): ICD-10-CM

## 2024-11-04 DIAGNOSIS — Z23 NEED FOR VACCINATION: ICD-10-CM

## 2024-11-04 DIAGNOSIS — Z00.00 ROUTINE GENERAL MEDICAL EXAMINATION AT A HEALTH CARE FACILITY: Primary | ICD-10-CM

## 2024-11-04 DIAGNOSIS — E80.4 GILBERT DISEASE: ICD-10-CM

## 2024-11-04 PROCEDURE — 90471 IMMUNIZATION ADMIN: CPT | Performed by: FAMILY MEDICINE

## 2024-11-04 PROCEDURE — 99396 PREV VISIT EST AGE 40-64: CPT | Performed by: FAMILY MEDICINE

## 2024-11-04 PROCEDURE — 90656 IIV3 VACC NO PRSV 0.5 ML IM: CPT | Performed by: FAMILY MEDICINE

## 2024-11-04 NOTE — H&P
Tomasz Young is a 59 year old male who presents for a complete physical exam.   HPI:   Pt complains of nothing.        Wt Readings from Last 6 Encounters:   11/04/24 179 lb 2 oz (81.3 kg)   07/15/24 181 lb 2 oz (82.2 kg)   06/26/24 183 lb (83 kg)   04/23/24 181 lb 4 oz (82.2 kg)   02/05/24 178 lb 9.6 oz (81 kg)   01/08/24 187 lb 12.8 oz (85.2 kg)     Body mass index is 24.81 kg/m².     Results for orders placed or performed in visit on 02/05/24   Creatine Kinase (CK) (Not Creatinine)    Collection Time: 02/12/24  1:19 PM   Result Value Ref Range    CREATINE KINASE, TOTAL 262 (H) 44 - 196 U/L   Hepatic Function Panel (7)    Collection Time: 02/12/24  1:19 PM   Result Value Ref Range    PROTEIN, TOTAL 6.5 6.1 - 8.1 g/dL    ALBUMIN 4.4 3.6 - 5.1 g/dL    GLOBULIN 2.1 1.9 - 3.7 g/dL (calc)    ALBUMIN/GLOBULIN RATIO 2.1 1.0 - 2.5 (calc)    BILIRUBIN, TOTAL 2.5 (H) 0.2 - 1.2 mg/dL    BILIRUBIN, DIRECT 0.4 (H) < OR = 0.2 mg/dL    BILIRUBIN, INDIRECT 2.1 (H) 0.2 - 1.2 mg/dL (calc)    ALKALINE PHOSPHATASE 58 35 - 144 U/L    AST 34 10 - 35 U/L    ALT 21 9 - 46 U/L         Current Outpatient Medications   Medication Sig Dispense Refill    GAVILYTE-G 236 g Oral Recon Soln Take by mouth one time.      Collagen Hydrolysate Does not apply Powder       Cholecalciferol (VITAMIN D) 1000 units Oral Tab Take 2,000 Units by mouth daily.      Multiple Vitamins-Minerals (EYE-LORENA EXTRA PLUS LUTEIN) Oral Tab Take by mouth.        Allergies   Allergen Reactions    Dust OTHER (SEE COMMENTS)      Past Medical History:    Colon adenoma    Goiter    Viral warts      Past Surgical History:   Procedure Laterality Date    Colonoscopy      Colonoscopy N/A 2/19/2016    Procedure: COLONOSCOPY;  Surgeon: Williams Melissa MD;  Location:  ENDOSCOPY    Colonoscopy N/A 3/11/2019    Procedure: COLONOSCOPY;  Surgeon: Williams Melissa MD;  Location:  ENDOSCOPY    Vasectomy        Family History   Problem Relation Age of Onset    Glaucoma Mother      Other (macular degeneration) Mother     Other (HTN) Mother     Pacemaker Father     Glaucoma Father     Heart Disorder Father     Other (bile duct cancer) Sister     Prostate Cancer Brother       Social History:  Social History     Socioeconomic History    Marital status:    Tobacco Use    Smoking status: Never    Smokeless tobacco: Never   Vaping Use    Vaping status: Never Used   Substance and Sexual Activity    Alcohol use: Yes     Alcohol/week: 3.0 - 6.0 standard drinks of alcohol     Types: 3 - 6 Standard drinks or equivalent per week    Drug use: No    Sexual activity: Yes   Other Topics Concern    Caffeine Concern Yes     Comment: 3x a quarter, tea 3x/wk    Exercise Yes     Comment: 3x week     Seat Belt Yes      Occ: . : y. Children: 2.   Exercise: three times per week.  Diet: watches minimally     REVIEW OF SYSTEMS:   GENERAL: feels well otherwise  SKIN: denies any unusual skin lesions  EYES:denies blurred vision or double vision  HEENT: denies nasal congestion, sinus pain or ST  LUNGS: denies shortness of breath with exertion  CARDIOVASCULAR: denies chest pain on exertion  GI: denies abdominal pain,denies heartburn  : denies nocturia or changes in stream  MUSCULOSKELETAL: denies back pain  NEURO: denies headaches  PSYCHE: denies depression or anxiety  HEMATOLOGIC: denies hx of anemia  ENDOCRINE: denies thyroid history  ALL/ASTHMA: denies hx of asthma; seasonal allergies    EXAM:   /66 (BP Location: Left arm, Patient Position: Sitting, Cuff Size: adult)   Pulse (!) 48   Resp 16   Ht 5' 11.25\" (1.81 m)   Wt 179 lb 2 oz (81.3 kg)   SpO2 100%   BMI 24.81 kg/m²   Body mass index is 24.81 kg/m².   GENERAL: well developed, well nourished,in no apparent distress  SKIN: no rashes,no suspicious lesions  HEENT: atraumatic, normocephalic,ears and throat clear  EYES:EOMI,conjunctiva are clear  NECK: supple,no adenopathy,no bruits; no thyromegaly  CHEST: no chest  tenderness  LUNGS: clear to auscultation  CARDIO: RRR without murmur  GI: good BS's,no masses, HSM or tenderness  : Karina P, MA  present.  two descended testes,no masses,no hernia,no penile lesions  RECTAL: DEFERRED  MUSCULOSKELETAL: back is not tender,FROM of the back; lipoma on ant. Left shoulder  EXTREMITIES: no cyanosis, clubbing or edema  NEURO: Oriented times three,cranial nerves are intact,motor and sensory are grossly intact    ASSESSMENT AND PLAN:   Tomasz Young is a 59 year old male who presents for a complete physical exam.    Pt's weight is Body mass index is 24.81 kg/m²., recommended low fat diet and aerobic exercise 30 minutes three times weekly.   Health maintenance, will check fasting Lipids, CMP, CBC, TSH c ref, vitamin D and PSA.    Reviewed in the office today.          Encounter Diagnoses   Name Primary?    Routine general medical examination at a health care facility Yes    PKD (polycystic kidney disease)     Gilbert disease     Need for vaccination        Orders Placed This Encounter   Procedures    INFLUENZA VACCINE, TRI, PRESERV FREE, 0.5 ML       Meds & Refills for this Visit:  Requested Prescriptions      No prescriptions requested or ordered in this encounter       Imaging & Consults:  INFLUENZA VACCINE, TRI, PRESERV FREE, 0.5 ML    Last eye exam -- 1/2024  Last dental exam -- 6/2024  Colonoscopy due in 2024.  Labs reviewed.  Dr. Tomas -- liver specialist  Dr. Brandt -- nephrology  -- PKD  -- stable  Vaccines -- UTD.  Given flu shot today.  Advised COVID.  Labs reviewed.      The patient indicates understanding of these issues and agrees to the plan.  The patient is asked to return in No follow-ups on file.

## 2024-11-12 LAB
% FREE PSA: 36 % (CALC)
ABSOLUTE BASOPHILS: 32 CELLS/UL (ref 0–200)
ABSOLUTE EOSINOPHILS: 59 CELLS/UL (ref 15–500)
ABSOLUTE LYMPHOCYTES: 1386 CELLS/UL (ref 850–3900)
ABSOLUTE MONOCYTES: 383 CELLS/UL (ref 200–950)
ABSOLUTE NEUTROPHILS: 2642 CELLS/UL (ref 1500–7800)
ALBUMIN/GLOBULIN RATIO: 2 (CALC) (ref 1–2.5)
ALBUMIN: 4.1 G/DL (ref 3.6–5.1)
ALKALINE PHOSPHATASE: 58 U/L (ref 35–144)
ALT: 20 U/L (ref 9–46)
APPEARANCE: CLEAR
AST: 32 U/L (ref 10–35)
BASOPHILS: 0.7 %
BILIRUBIN, TOTAL: 3.5 MG/DL (ref 0.2–1.2)
BILIRUBIN: NEGATIVE
BUN: 17 MG/DL (ref 7–25)
CALCIUM: 9 MG/DL (ref 8.6–10.3)
CARBON DIOXIDE: 28 MMOL/L (ref 20–32)
CHLORIDE: 103 MMOL/L (ref 98–110)
CHOL/HDLC RATIO: 2 (CALC)
CHOLESTEROL, TOTAL: 153 MG/DL
COLOR: YELLOW
CREATININE: 1.09 MG/DL (ref 0.7–1.3)
EGFR: 78 ML/MIN/1.73M2
EOSINOPHILS: 1.3 %
FREE PSA: 0.4 NG/ML
GLOBULIN: 2.1 G/DL (CALC) (ref 1.9–3.7)
GLUCOSE: 92 MG/DL (ref 65–99)
GLUCOSE: NEGATIVE
HDL CHOLESTEROL: 75 MG/DL
HEMATOCRIT: 44.1 % (ref 38.5–50)
HEMOGLOBIN: 14.1 G/DL (ref 13.2–17.1)
KETONES: NEGATIVE
LDL-CHOLESTEROL: 65 MG/DL (CALC)
LEUKOCYTE ESTERASE: NEGATIVE
LYMPHOCYTES: 30.8 %
MCH: 31.9 PG (ref 27–33)
MCHC: 32 G/DL (ref 32–36)
MCV: 99.8 FL (ref 80–100)
MONOCYTES: 8.5 %
MPV: 10.6 FL (ref 7.5–12.5)
NEUTROPHILS: 58.7 %
NITRITE: NEGATIVE
NON-HDL CHOLESTEROL: 78 MG/DL (CALC)
OCCULT BLOOD: NEGATIVE
PH: 6 (ref 5–8)
PLATELET COUNT: 196 THOUSAND/UL (ref 140–400)
POTASSIUM: 4 MMOL/L (ref 3.5–5.3)
PROTEIN, TOTAL: 6.2 G/DL (ref 6.1–8.1)
PROTEIN: NEGATIVE
RDW: 11.7 % (ref 11–15)
RED BLOOD CELL COUNT: 4.42 MILLION/UL (ref 4.2–5.8)
SODIUM: 140 MMOL/L (ref 135–146)
SPECIFIC GRAVITY: 1.02 (ref 1–1.03)
TOTAL PSA: 1.1 NG/ML
TRIGLYCERIDES: 53 MG/DL
TSH W/REFLEX TO FT4: 1.2 MIU/L (ref 0.4–4.5)
VITAMIN D, 25-OH, TOTAL: 34 NG/ML (ref 30–100)
WHITE BLOOD CELL COUNT: 4.5 THOUSAND/UL (ref 3.8–10.8)

## 2024-11-18 ENCOUNTER — PATIENT MESSAGE (OUTPATIENT)
Dept: FAMILY MEDICINE CLINIC | Facility: CLINIC | Age: 59
End: 2024-11-18

## 2024-11-18 DIAGNOSIS — M75.42 IMPINGEMENT SYNDROME OF LEFT SHOULDER: ICD-10-CM

## 2024-11-18 DIAGNOSIS — M25.512 LEFT SHOULDER PAIN, UNSPECIFIED CHRONICITY: Primary | ICD-10-CM

## 2024-11-18 NOTE — TELEPHONE ENCOUNTER
Dr FORRESTER reviewed MRI report received from [facility]  Pt has been going to PT 3 months and has noted no improvement in his shoulder, which was why he pursued MRI   Ortho referral pended, pls confirm dx thanks!

## 2024-12-11 ENCOUNTER — OFFICE VISIT (OUTPATIENT)
Dept: ORTHOPEDICS CLINIC | Facility: CLINIC | Age: 59
End: 2024-12-11
Payer: COMMERCIAL

## 2024-12-11 ENCOUNTER — TELEPHONE (OUTPATIENT)
Dept: ORTHOPEDICS CLINIC | Facility: CLINIC | Age: 59
End: 2024-12-11

## 2024-12-11 VITALS — WEIGHT: 179 LBS | HEIGHT: 71 IN | BODY MASS INDEX: 25.06 KG/M2

## 2024-12-11 DIAGNOSIS — M75.42 SUBACROMIAL IMPINGEMENT OF LEFT SHOULDER: ICD-10-CM

## 2024-12-11 DIAGNOSIS — M54.50 LOW BACK PAIN, UNSPECIFIED BACK PAIN LATERALITY, UNSPECIFIED CHRONICITY, UNSPECIFIED WHETHER SCIATICA PRESENT: Primary | ICD-10-CM

## 2024-12-11 DIAGNOSIS — M19.019 AC JOINT ARTHROPATHY: Primary | ICD-10-CM

## 2024-12-11 PROCEDURE — 99204 OFFICE O/P NEW MOD 45 MIN: CPT | Performed by: ORTHOPAEDIC SURGERY

## 2024-12-11 PROCEDURE — 20605 DRAIN/INJ JOINT/BURSA W/O US: CPT | Performed by: ORTHOPAEDIC SURGERY

## 2024-12-11 RX ORDER — TRIAMCINOLONE ACETONIDE 40 MG/ML
40 INJECTION, SUSPENSION INTRA-ARTICULAR; INTRAMUSCULAR ONCE
Status: COMPLETED | OUTPATIENT
Start: 2024-12-11 | End: 2024-12-11

## 2024-12-11 RX ORDER — KETOROLAC TROMETHAMINE 30 MG/ML
30 INJECTION, SOLUTION INTRAMUSCULAR; INTRAVENOUS ONCE
Status: COMPLETED | OUTPATIENT
Start: 2024-12-11 | End: 2024-12-11

## 2024-12-11 RX ADMIN — KETOROLAC TROMETHAMINE 30 MG: 30 INJECTION, SOLUTION INTRAMUSCULAR; INTRAVENOUS at 11:18:00

## 2024-12-11 RX ADMIN — TRIAMCINOLONE ACETONIDE 40 MG: 40 INJECTION, SUSPENSION INTRA-ARTICULAR; INTRAMUSCULAR at 11:18:00

## 2024-12-11 NOTE — TELEPHONE ENCOUNTER
Patient scheduled on [facility] for lumbar pain. [place] advise if imaging is needed for dr Adams    Future Appointments   Date Time Provider Department Center   12/16/2024 10:40 AM Kyaw Adams MD [facility]   2/3/2025  2:00 PM Bal Tomas MD [facility]   11/10/2025  3:00 PM Michelle Guillen DO [facility] [place]

## 2024-12-11 NOTE — PROCEDURES
Left Shoulder AC Joint Injection - Subacromial space    Name: Tomasz Young   MRN: [id number]  Date: 12/11/2024     Clinical Indications:   Subacromial impingement with symptoms refractory to conservative measures. AC joint arthropathy.     After informed consent, the injection site was marked, sterilized with topical chlorhexidine antiseptic, and locally anesthetized with skin refrigerant.    The patient was seated upright and the shoulder was exposed. Using sterile technique: 1 mL of 30mg/mL of Ketorolac, 2 mL of 0.5% Bupivicaine, 2 mL of 1% Lidocaine, and 1 mg of 40mg/mL of Triamcinolone (Kenalog) was injected with a Anterior approach utilizing a 22 gauge needle.  A band-aid was applied.  The patient tolerated the procedure well.        Jose Alfredo Rod MD  Knee, Shoulder, & Elbow Surgery / Sports Medicine Specialist  Orthopaedic Surgery  [address] [place] [id number]   [email or web address]  [email or web address]  t: [phone]  o: [phone]  f: [phone]

## 2024-12-11 NOTE — H&P
Orthopaedic Surgery  [address] [place] [id number]  [phone]     NEW PATIENT VISIT - HISTORY AND PHYSICAL EXAMINATION     Name: Tomasz Young   MRN: [id number]  Date: 12/11/2024     CC: Left shoulder pain    REFERRED BY: Michelle Guillen DO    HPI:   Tomasz Young is a very pleasant 59 year old ambidextrous male who presents today for evaluation of left shoulder pain ongoing since early 2023. At the time, he improved from physical therapy. Symptoms were re-aggravated this summer after moving furniture. He returned to  for a couple months with minimal improvement. Pain is 5/10. Reports difficulty lifting. Patient also obtained an MRI and is here for further review.    He works as an analyst.    PMH:   Past Medical History:    Colon adenoma    Goiter    Viral warts       PAST SURGICAL HX:  Past Surgical History:   Procedure Laterality Date    Colonoscopy      Colonoscopy N/A 2/19/2016    Procedure: COLONOSCOPY;  Surgeon: Williams Melissa MD;  Location:  ENDOSCOPY    Colonoscopy N/A 3/11/2019    Procedure: COLONOSCOPY;  Surgeon: Williams Melissa MD;  Location:  ENDOSCOPY    Vasectomy         FAMILY HX:  Family History   Problem Relation Age of Onset    Glaucoma Mother     Other (macular degeneration) Mother     Other (HTN) Mother     Pacemaker Father     Glaucoma Father     Heart Disorder Father     Other (bile duct cancer) Sister     Prostate Cancer Brother        ALLERGIES:  Dust    MEDICATIONS:   Current Outpatient Medications   Medication Sig Dispense Refill    GAVILYTE-G 236 g Oral Recon Soln Take by mouth one time.      Collagen Hydrolysate Does not apply Powder       Cholecalciferol (VITAMIN D) 1000 units Oral Tab Take 2,000 Units by mouth daily.      Multiple Vitamins-Minerals (EYE-LORENA EXTRA PLUS LUTEIN) Oral Tab Take by mouth.         ROS: A comprehensive 14 point review of systems was performed and was negative aside from the aforementioned per history of present  illness.    SOCIAL HX:  Social History     Tobacco Use    Smoking status: Never    Smokeless tobacco: Never   Substance Use Topics    Alcohol use: Yes     Alcohol/week: 3.0 - 6.0 standard drinks of alcohol     Types: 3 - 6 Standard drinks or equivalent per week       PE:   Vitals:    12/11/24 1017   Weight: 179 lb   Height: 5' 11\" (1.803 m)     Estimated body mass index is 24.97 kg/m² as calculated from the following:    Height as of this encounter: 5' 11\" (1.803 m).    Weight as of this encounter: 179 lb.    Physical Exam  Constitutional:       Appearance: Normal appearance.   HENT:      Head: Normocephalic and atraumatic.   Eyes:      Extraocular Movements: Extraocular movements intact.   Neck:      Musculoskeletal: Normal range of motion and neck supple.   Cardiovascular:      Pulses: Normal pulses.   Pulmonary:      Effort: Pulmonary effort is normal. No respiratory distress.   Abdominal:      General: There is no distension.   Skin:     General: Skin is warm.      Capillary Refill: Capillary refill takes less than 2 seconds.      Findings: No bruising.   Neurological:      General: No focal deficit present.      Mental Status: Alert.   Psychiatric:         Mood and Affect: Mood normal.     Examination of the left shoulder demonstrates:   Skin is intact, warm and dry.   Cervical:  Full ROM  Spurling's  Negative    Deformity:   none  Atrophy:   none    Scapular winging: Negative    Palpation:     AC Joint  Positive  Biceps Tendon  Negative  Greater Tuberosity Negative    ROM:   Forward Flexion:  full and symmetric  Abduction:   full and symmetric  External Rotation:  full and symmetric  Internal Rotation:  full and symmetric    Rotator Cuff Strength:   Supraspinatus:   5/5  Subscapularis:   5/5  Infraspinatus/Teres: 5/5    Provocative Tests:   Mukherjee:   Positive  Speed's:   Negative  Iredell's:   Negative  Lift-off:    Negative  Apprehension:  Negative  Sulcus Sign:   Negative    Neurovascular Upper Extremity  (Bilateral)  Motor:    5/5 EPL, Finger Abduction, , Pinch, Deltoid  Sensation:   intact to light touch median, ulnar, radial and axillary nerve  Circulation:   Normal, 2+ radial pulse    The contralateral upper extremity is without limitation in range of motion or strength, no positive provocative maneuvers.       Radiographic Examination/Diagnostics:    Shoulder MRI personally viewed, independently interpreted and radiology report was reviewed.    MRI SHOULDER LT W CONTRAST    CPT Code(s): 59419 - MRI JOINT UPR EXTREM W/DYE CLINICAL HISTORY: M75.42, impingement syndrome of left shoulder. Left shoulder pain. COMPARISON: None. TECHNIQUE: Left shoulder MR arthrogram. Intra-articular gadolinium contrast administered under fluoroscopy from an anterior approach. Exam performed on an ultra-high-field 3.0 Beverly MR scanner. (PDFS refers to proton density fat-saturated sequence.) FINDINGS: T1 hyperintense intra-articular gadolinium contrast present in the left shoulder joint from arthrography. Moderate degenerative change at the acromioclavicular joint. Hypertrophic spurring along the inferior margin of the acromioclavicular joint effaces the subacromial fat and mildly indents the superior margin of the supraspinatus muscle at the myotendinous junction. Type 2 acromion. No significant degenerative change at the glenohumeral joint. No bone contusion, fracture, or dislocation identified. No Hill-Sachs deformity or osseous Bankart lesion identified. Mild edema signal is identified in the posterolateral deltoid muscle (images 25-29, sagittal PDFS series 11). The ill-defined mildly hyperintense proton density signal in the anterior deltoid muscle and in the overlying subcutaneous tissues is related to local anesthetic administration from the arthrogram procedure. Minimal tendinosis of the supraspinatus tendon, although the supraspinatus tendon is intact. The infraspinatus, subscapularis, and teres minor tendons are intact  without tendinosis or tear. No edema or atrophy is identified in the rotator cuff musculature. There is no intra-articular gadolinium contrast extension into the subacromial/subdeltoid bursa. No significant bursal fluid collections are identified at the left shoulder. Minimal fraying of the posterior-superior labrum (image 13, axial T1-weighted series 5). The labrum is otherwise intact. No paralabral cysts are identified. The long head biceps tendon is intact and is located normally in the bicipital groove.     IMPRESSION:   1. Moderate degenerative change at the acromioclavicular joint with hypertrophic spurring along the inferior margin of the joint effacing the subacromial fat and mildly indenting the superior margin of the supraspinatus muscle at the myotendinous junction.   2. Strain of the posterolateral deltoid muscle.   3. Minimal tendinosis of the supraspinatus tendon, although the supraspinatus tendon is intact. The remaining rotator cuff tendons are intact without tendinosis or tear.   4. Minimal fraying of the posterior-superior labrum, although the labrum is otherwise intact.    Independent review suggests slight SLAP pathology.     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 He has tried physical therapy in the past.    We elected to maximize conservative management with intra-articular corticosteroid and ketorolac injection.     PLAN:   We had a detailed discussion outlining the etiology, anatomy, pathophysiology, and natural history of patient's findings. Imaging was reviewed in detail and correlated to a 3-dimensional model of the shoulder.     We reviewed the treatment of this disease condition.  Fortunately, treatment is amenable to conservative treatment which we chose to optimize at today's visit.  After a discussion of a variety of conservative treatment options, I  recommended intra-articular injection with corticosteroid and ketorolac.       We elected to proceed with the injection procedure at today's visit. We discussed the risk and benefits of the procedure; including, but not limited to: infection, injury to blood vessels, nerve injury, prolonged pain, swelling, site soreness, failure to progress, and need for advanced treatments.  The patient voiced understanding and agreed to proceed with the treatment plan.      We provided education, and discussed at great length the use of OrthoBiologics, specifically, Platelet Rich Plasma (PRP). We discussed the growing evidence for the efficacy of PRP injections with regard to the patient's specific findings, as well as the promotion of healing for muscle, tendon, and joint injuries.     We discussed the scientific rationale for this procedure which is that the plasma contains platelets which release growth factors that induce a healing response wherever they are applied. We also discussed the benefits, risks, and limitations. The patient understands that there is a slightly higher level of complexity to this procedure compared to other injections such as cortisone, or viscosupplementation.     We also discussed the benefits of PRP in comparison to surgery, specifically including, but not limited to: less invasive than an open surgical procedure for the same condition, possible shorter recovery time, significantly more cost effective.      If symptoms continue to persist, we also discussed the option for shoulder arthroscopy as it will likely offer the best opportunity for symptomatic relief and functional recovery.    FOLLOW-UP:   Return to clinic on an as needed basis.       Jose Alfredo Rod MD  Knee, Shoulder, & Elbow Surgery / Sports Medicine Specialist  Orthopaedic Surgery  [address] [place] [address] [id number] [place] [email or web address]  [email or web address]  t: [phone]  o: [phone]  f: [phone]    This note was  dictated using Dragon software.  While it was briefly proofread prior to completion, some grammatical, spelling, and word choice errors due to dictation may still occur.

## 2024-12-13 NOTE — TELEPHONE ENCOUNTER
Called patient to schedule imaging per last notes.    Future Appointments   Date Time Provider Department Center   12/23/2024  8:10 AM [facility] [place]   12/23/2024  8:40 AM Kyaw Adams MD [facility]     Patient is aware to arrive 15-20 mins early.

## 2024-12-23 ENCOUNTER — HOSPITAL ENCOUNTER (OUTPATIENT)
Dept: GENERAL RADIOLOGY | Age: 59
Discharge: HOME | End: 2024-12-23
Attending: STUDENT IN AN ORGANIZED HEALTH CARE EDUCATION/TRAINING PROGRAM
Payer: COMMERCIAL

## 2024-12-23 ENCOUNTER — PATIENT MESSAGE (OUTPATIENT)
Dept: ORTHOPEDICS CLINIC | Facility: CLINIC | Age: 59
End: 2024-12-23

## 2024-12-23 ENCOUNTER — OFFICE VISIT (OUTPATIENT)
Dept: ORTHOPEDICS CLINIC | Facility: CLINIC | Age: 59
End: 2024-12-23
Payer: COMMERCIAL

## 2024-12-23 DIAGNOSIS — M75.42 SUBACROMIAL IMPINGEMENT OF LEFT SHOULDER: ICD-10-CM

## 2024-12-23 DIAGNOSIS — M51.360 DEGENERATION OF INTERVERTEBRAL DISC OF LUMBAR REGION WITH DISCOGENIC BACK PAIN: Primary | ICD-10-CM

## 2024-12-23 DIAGNOSIS — M54.50 LOW BACK PAIN, UNSPECIFIED BACK PAIN LATERALITY, UNSPECIFIED CHRONICITY, UNSPECIFIED WHETHER SCIATICA PRESENT: ICD-10-CM

## 2024-12-23 DIAGNOSIS — M19.019 AC JOINT ARTHROPATHY: ICD-10-CM

## 2024-12-23 PROCEDURE — 72100 X-RAY EXAM L-S SPINE 2/3 VWS: CPT | Performed by: STUDENT IN AN ORGANIZED HEALTH CARE EDUCATION/TRAINING PROGRAM

## 2024-12-23 PROCEDURE — 99204 OFFICE O/P NEW MOD 45 MIN: CPT | Performed by: STUDENT IN AN ORGANIZED HEALTH CARE EDUCATION/TRAINING PROGRAM

## 2024-12-23 NOTE — H&P
[facility] - ORTHOPEDICS  [address], [address], [place] [address]  [address] [place] [id number]  [phone]     NEW PATIENT VISIT - HISTORY AND PHYSICAL EXAMINATION     Name: Tomasz Young   MRN: [id number]  Date: 12/23/24       CC: back pain    REFERRED BY: Michelle Guillen DO    HPI:   Tomasz Young is a very pleasant 59 year old male who presents today for evaluation of back pain. The distribution of symptoms are: 100% backpain and 0% leg pain. The symptoms began 3 year(s) ago without any significant injury. Since the onset, the symptoms have become slowly worse over time. Patient feels pain is aggravated by activity and improved by rest. The patient reports no numbness and no weakness.  The symptom characteristics are as follows: Patient is a 59-year-old male presenting with predominant low back pain symptoms have progressively worsened over the past 3 years.  Patient is otherwise active, but has not been able to participate in high level running and biking.  Patient has had numerous rounds of physical therapy without sustained related.     Prior spine surgery: none.    Bowel and bladder symptoms: absent.    The patient has not had issues with balance and/or hand dexterity problems such as changes in penmanship or the use of buttons or zippers.    Treatment up to this time has included:    Evaluation: PCP  NSAIDS: have been partially helpful  Narcotic use: None  Physical therapy: completed rounds of PT and chiropractic care over the past 3 years  Spinal injections: None  Others:       PMH:   Past Medical History:    Colon adenoma    Goiter    Viral warts       PAST SURGICAL HX:  Past Surgical History:   Procedure Laterality Date    Colonoscopy      Colonoscopy N/A 2/19/2016    Procedure: COLONOSCOPY;  Surgeon: Williams Melissa MD;  Location:  ENDOSCOPY    Colonoscopy N/A 3/11/2019    Procedure: COLONOSCOPY;  Surgeon: Williams Melissa MD;  Location:   ENDOSCOPY    Vasectomy         FAMILY HX:  Family History   Problem Relation Age of Onset    Glaucoma Mother     Other (macular degeneration) Mother     Other (HTN) Mother     Pacemaker Father     Glaucoma Father     Heart Disorder Father     Other (bile duct cancer) Sister     Prostate Cancer Brother        ALLERGIES:  Dust    MEDICATIONS:   Current Outpatient Medications   Medication Sig Dispense Refill    GAVILYTE-G 236 g Oral Recon Soln Take by mouth one time.      Collagen Hydrolysate Does not apply Powder       Cholecalciferol (VITAMIN D) 1000 units Oral Tab Take 2,000 Units by mouth daily.      Multiple Vitamins-Minerals (EYE-LORENA EXTRA PLUS LUTEIN) Oral Tab Take by mouth.         ROS: A comprehensive 14 point review of systems was performed and was negative aside from the aforementioned per history of present illness.    SOCIAL HX:  Social History     Tobacco Use    Smoking status: Never    Smokeless tobacco: Never   Substance Use Topics    Alcohol use: Yes     Alcohol/week: 3.0 - 6.0 standard drinks of alcohol     Types: 3 - 6 Standard drinks or equivalent per week         PE:   There were no vitals filed for this visit.  Estimated body mass index is 24.97 kg/m² as calculated from the following:    Height as of 12/11/24: 5' 11\" (1.803 m).    Weight as of 12/11/24: 179 lb (81.2 kg).    Physical Exam  Constitutional:       Appearance: Normal appearance.   HENT:      Head: Normocephalic and atraumatic.   Eyes:      Extraocular Movements: Extraocular movements intact.   Cardiovascular:      Pulses: Normal pulses. Skin warm and well perfused.  Pulmonary:      Effort: Pulmonary effort is normal. No respiratory distress.   Skin:     General: Skin is warm.   Psychiatric:         Mood and Affect: Mood normal.     Spine Exam:    Normal gait without difficulty  Able to heel, toe, tandem gait without difficulty  Level shoulders and hips in even stance    Restricted L-spine ROM    No tenderness to palpation of  L-spine    Straight leg raise test: negative    Sustained clonus: negative    LE Strength: 5/5 IP QUAD TA EHL GSC  LE Sensation: normal in L2-S1 distribution  LE reflexes: normal    Radiographic Examination/Diagnostics:  XR personally viewed, independently interpreted and radiology report was reviewed.  X-ray of the lumbar spine demonstrates severe degenerative disk disease at L4-5 with asymmetric disc collapse    IMPRESSION: Tomasz Young is a 59 year old male with L4-5 DDD refractory to medical treatment    PLAN:     We reviewed the patients history, symptoms, exam findings, and imaging today.  We had a detailed discussion outlining the etiology, anatomy, pathophysiology, and natural history of degenerative disc disease. The typical management of this condition may include lifestyle modification, NSAIDs, physical therapy, oral steroids, epidural injections, neuromodulatory medications, and sometimes pain medications.  Based on our discussion today we would like to have the patient initiate our recommendations for continued conservative therapy in the treatment of their condition noted in the assessment section.     - MRI lumbar spine  - Pain clinic referral for TPI and facet injections  - Referred to Physical Therapy: home exercise program, aerobic exercises, core strengthening and conditioning, possible manipulative therapy,  and modalities as indicated      FOLLOW-UP:  We will see him back in follow-up in 3 months, or sooner if any problems arise. Patient understands and agrees with plan.      Kyaw Adams MD  Orthopedic Spine Surgeon  [facility]   [address] [place] [address].[email or web address]  [email or web address]  t: [phone]   f: [phone]        This note was dictated using Dragon software.  While it was briefly proofread prior to completion, some grammatical, spelling, and word choice errors due to dictation may  still occur.

## 2025-02-03 ENCOUNTER — APPOINTMENT (OUTPATIENT)
Dept: SURGERY | Facility: CLINIC | Age: 60
End: 2025-02-03

## (undated) DEVICE — ENDOSCOPY PACK - LOWER: Brand: MEDLINE INDUSTRIES, INC.

## (undated) DEVICE — Device: Brand: DEFENDO AIR/WATER/SUCTION AND BIOPSY VALVE

## (undated) DEVICE — ACUSNARE POLYPECTOMY SNARE: Brand: ACUSNARE

## (undated) DEVICE — TRAP 4 CPTR CHMBR N EZ INLN

## (undated) DEVICE — FILTERLINE NASAL ADULT O2/CO2

## (undated) DEVICE — 3M™ RED DOT™ MONITORING ELECTRODE WITH FOAM TAPE AND STICKY GEL, 50/BAG, 20/CASE, 72/PLT 2570: Brand: RED DOT™

## (undated) DEVICE — 1200CC GUARDIAN II: Brand: GUARDIAN

## (undated) NOTE — LETTER
3/19/2019          [address] [place] [phone]    Dear [place],     I reviewed the results from your colonoscopy. You had one polyp removed. It is benign.      In light of this and your history of colon polyps, your n